# Patient Record
Sex: FEMALE | Race: WHITE | NOT HISPANIC OR LATINO | Employment: FULL TIME | RURAL
[De-identification: names, ages, dates, MRNs, and addresses within clinical notes are randomized per-mention and may not be internally consistent; named-entity substitution may affect disease eponyms.]

---

## 2017-03-27 ENCOUNTER — OFFICE VISIT (OUTPATIENT)
Dept: FAMILY MEDICINE CLINIC | Facility: CLINIC | Age: 37
End: 2017-03-27

## 2017-03-27 VITALS
DIASTOLIC BLOOD PRESSURE: 88 MMHG | HEIGHT: 61 IN | WEIGHT: 133 LBS | BODY MASS INDEX: 25.11 KG/M2 | TEMPERATURE: 99 F | HEART RATE: 93 BPM | SYSTOLIC BLOOD PRESSURE: 133 MMHG

## 2017-03-27 DIAGNOSIS — L04.9 ADENITIS, ACUTE: ICD-10-CM

## 2017-03-27 DIAGNOSIS — L70.0 CYSTIC ACNE: Primary | ICD-10-CM

## 2017-03-27 PROCEDURE — 99213 OFFICE O/P EST LOW 20 MIN: CPT | Performed by: FAMILY MEDICINE

## 2017-03-27 PROCEDURE — 96372 THER/PROPH/DIAG INJ SC/IM: CPT | Performed by: FAMILY MEDICINE

## 2017-03-27 RX ORDER — NORGESTREL-ETHINYL ESTRADIOL 0.3-0.03MG
TABLET ORAL
COMMUNITY
Start: 2017-02-24 | End: 2017-11-30

## 2017-03-27 RX ORDER — METHYLPREDNISOLONE ACETATE 80 MG/ML
80 INJECTION, SUSPENSION INTRA-ARTICULAR; INTRALESIONAL; INTRAMUSCULAR; SOFT TISSUE ONCE
Status: COMPLETED | OUTPATIENT
Start: 2017-03-27 | End: 2017-03-27

## 2017-03-27 RX ORDER — CEPHALEXIN 500 MG/1
500 CAPSULE ORAL 3 TIMES DAILY
Qty: 30 CAPSULE | Refills: 0 | Status: SHIPPED | OUTPATIENT
Start: 2017-03-27 | End: 2017-11-30

## 2017-03-27 RX ADMIN — METHYLPREDNISOLONE ACETATE 80 MG: 80 INJECTION, SUSPENSION INTRA-ARTICULAR; INTRALESIONAL; INTRAMUSCULAR; SOFT TISSUE at 16:30

## 2017-03-27 NOTE — PROGRESS NOTES
"Subjective   Mohit Martines is a 36 y.o. female.     History of Present Illness recurrent facial swelling consistent with prior episodes of cystic acne localized abscess.  Has very painful swelling under chin consistent with recurrent lymph node.  Unfortunately this is recurrent.  Has been taking doxycycline for quite some time as recommended by dermatology.  Has tried other antibiotics as suppressant in the past.  Has used topical therapies has used laser therapy has used light therapy.  Unfortunately things continue to wax and wane/recur.  Has visited with new gynecologist and is on a different formulation now for menstrual cycles to help with some androgenic symptoms and has had menstrual flow for about 2+ weeks.    The following portions of the patient's history were reviewed and updated as appropriate: allergies, current medications, past family history, past social history, past surgical history and problem list.    Review of Systems see the history of present illness    Objective   Physical Exam   Constitutional: She is oriented to person, place, and time. She appears well-developed and well-nourished.   HENT:   Head: Normocephalic.   Right Ear: External ear normal.   Left Ear: External ear normal.   Mouth/Throat: Oropharynx is clear and moist.   Eyes: Conjunctivae and EOM are normal. Pupils are equal, round, and reactive to light.   Neck: Normal range of motion. Neck supple.   Very tender submental reactive lymph node.   Cardiovascular: Normal rate and regular rhythm.    Pulmonary/Chest: Breath sounds normal.   Neurological: She is alert and oriented to person, place, and time.   Skin: Skin is warm and dry.   Has cystic acne outbreak anterior chin and right cheek close to upper lip.   Psychiatric: She has a normal mood and affect.     /88 (BP Location: Right arm, Patient Position: Sitting)  Pulse 93  Temp 99 °F (37.2 °C) (Oral)   Ht 61\" (154.9 cm)  Wt 133 lb (60.3 kg)  BMI 25.13 " kg/m2  Assessment/Plan   Mohit was seen today for facial swelling.    Diagnoses and all orders for this visit:    Cystic acne  -     methylPREDNISolone acetate (DEPO-medrol) injection 80 mg; Inject 1 mL into the shoulder, thigh, or buttocks 1 (One) Time.    Adenitis, acute    Other orders  -     cephalexin (KEFLEX) 500 MG capsule; Take 1 capsule by mouth 3 (Three) Times a Day.      Will try a different antibiotic.  Injection given.  I encourage you to consider a different dermatology consult.  It has been recommended for you to consider Accutane but I appreciate your reluctance to do so.  I am concerned about the long-term antibiotic use and you understand also.  Contact us if symptoms do not improve as they have in the past.

## 2017-04-26 DIAGNOSIS — L70.0 CYSTIC ACNE: Primary | ICD-10-CM

## 2017-11-29 ENCOUNTER — TELEPHONE (OUTPATIENT)
Dept: FAMILY MEDICINE CLINIC | Facility: CLINIC | Age: 37
End: 2017-11-29

## 2017-11-29 NOTE — TELEPHONE ENCOUNTER
PATIENT CALLED AND IS COMPLAINING OF SINUS DRAINAGE DOWN THROAT, NOSE EITHER STOPPED UP OR DRAINAGE, CHILLING, DISCOLORED MUCOUS. SHE IS TAKING TYLENOL AND SUDIFED AND ZYRTEC NIGHTLY. PATIENT USES [facility].

## 2017-11-30 ENCOUNTER — OFFICE VISIT (OUTPATIENT)
Dept: FAMILY MEDICINE CLINIC | Facility: CLINIC | Age: 37
End: 2017-11-30

## 2017-11-30 VITALS
HEIGHT: 61 IN | SYSTOLIC BLOOD PRESSURE: 119 MMHG | HEART RATE: 96 BPM | WEIGHT: 114.6 LBS | DIASTOLIC BLOOD PRESSURE: 79 MMHG | BODY MASS INDEX: 21.64 KG/M2 | OXYGEN SATURATION: 96 % | TEMPERATURE: 98.8 F

## 2017-11-30 DIAGNOSIS — J06.9 ACUTE URI: Primary | ICD-10-CM

## 2017-11-30 DIAGNOSIS — N76.0 ACUTE VAGINITIS: ICD-10-CM

## 2017-11-30 PROCEDURE — 99213 OFFICE O/P EST LOW 20 MIN: CPT | Performed by: NURSE PRACTITIONER

## 2017-11-30 PROCEDURE — 96372 THER/PROPH/DIAG INJ SC/IM: CPT | Performed by: NURSE PRACTITIONER

## 2017-11-30 RX ORDER — NORGESTIMATE AND ETHINYL ESTRADIOL 7DAYSX3 28
1 KIT ORAL DAILY
COMMUNITY
End: 2022-01-21

## 2017-11-30 RX ORDER — AMOXICILLIN AND CLAVULANATE POTASSIUM 875; 125 MG/1; MG/1
TABLET, FILM COATED ORAL
COMMUNITY
Start: 2017-09-26 | End: 2018-12-26

## 2017-11-30 RX ORDER — SPIRONOLACTONE 50 MG/1
TABLET, FILM COATED ORAL
COMMUNITY
Start: 2017-11-12 | End: 2020-02-27

## 2017-11-30 RX ORDER — FLUCONAZOLE 150 MG/1
150 TABLET ORAL ONCE
Qty: 1 TABLET | Refills: 0 | Status: SHIPPED | OUTPATIENT
Start: 2017-11-30 | End: 2017-11-30

## 2017-11-30 RX ORDER — METHYLPREDNISOLONE ACETATE 80 MG/ML
80 INJECTION, SUSPENSION INTRA-ARTICULAR; INTRALESIONAL; INTRAMUSCULAR; SOFT TISSUE ONCE
Status: COMPLETED | OUTPATIENT
Start: 2017-11-30 | End: 2017-11-30

## 2017-11-30 RX ADMIN — METHYLPREDNISOLONE ACETATE 80 MG: 80 INJECTION, SUSPENSION INTRA-ARTICULAR; INTRALESIONAL; INTRAMUSCULAR; SOFT TISSUE at 16:33

## 2017-11-30 NOTE — PROGRESS NOTES
"Subjective   Mohit Martines is a 37 y.o. female.   Chief Complaint   Patient presents with   • Sinusitis     Sinusitis   This is a new problem. The current episode started in the past 7 days. The problem has been waxing and waning since onset. There has been no fever. Associated symptoms include congestion, coughing, ear pain, headaches, sinus pressure and a sore throat. Pertinent negatives include no chills, shortness of breath or sneezing. Treatments tried: 4 doses of augmentin. The treatment provided no relief.   Also states the augmentin has caused a yeast infection.     The following portions of the patient's history were reviewed and updated as appropriate: allergies, current medications, past family history, past medical history, past social history, past surgical history and problem list.  /79 (BP Location: Left arm, Patient Position: Sitting, Cuff Size: Adult)  Pulse 96  Temp 98.8 °F (37.1 °C) (Oral)   Ht 61\" (154.9 cm)  Wt 114 lb 9.6 oz (52 kg)  SpO2 96%  BMI 21.65 kg/m2  Review of Systems   Constitutional: Negative for chills, fatigue and fever.   HENT: Positive for congestion, ear pain, sinus pressure and sore throat. Negative for rhinorrhea and sneezing.    Respiratory: Positive for cough. Negative for shortness of breath and wheezing.    Cardiovascular: Negative for chest pain, palpitations and leg swelling.   Gastrointestinal: Negative for abdominal pain, diarrhea, nausea and vomiting.   Genitourinary: Positive for vaginal discharge. Negative for dysuria.   Musculoskeletal: Negative for back pain and myalgias.   Skin: Negative for rash and wound.   Neurological: Positive for headaches. Negative for dizziness and light-headedness.   Psychiatric/Behavioral: Negative for sleep disturbance. The patient is not nervous/anxious.        Objective   Physical Exam   Constitutional: She is oriented to person, place, and time. She appears well-developed and well-nourished.   HENT:   Head: " Normocephalic and atraumatic.   Bilateral TM full, no erythema  PND   Cardiovascular: Normal rate, regular rhythm and normal heart sounds.    Pulmonary/Chest: Effort normal and breath sounds normal.   Abdominal: Soft. Bowel sounds are normal.   Musculoskeletal: Normal range of motion.   Neurological: She is alert and oriented to person, place, and time.   Skin: Skin is warm and dry.   Psychiatric: She has a normal mood and affect. Her behavior is normal.       Assessment/Plan   Mohit was seen today for sinusitis.    Diagnoses and all orders for this visit:    Acute URI  -     methylPREDNISolone acetate (DEPO-medrol) injection 80 mg; Inject 1 mL into the shoulder, thigh, or buttocks 1 (One) Time.    Acute vaginitis  -     fluconazole (DIFLUCAN) 150 MG tablet; Take 1 tablet by mouth 1 (One) Time for 1 dose.      I have ordered a steroid injection today. She has tolerated well in the past. For the vaginitis, I have prescribed Diflucan. Supportive measures encouraged.  Follow up in 2-3 days if no improvement or if symptoms worsen.

## 2018-12-26 ENCOUNTER — OFFICE VISIT (OUTPATIENT)
Dept: FAMILY MEDICINE CLINIC | Facility: CLINIC | Age: 38
End: 2018-12-26

## 2018-12-26 VITALS
SYSTOLIC BLOOD PRESSURE: 122 MMHG | WEIGHT: 130.2 LBS | BODY MASS INDEX: 24.58 KG/M2 | HEIGHT: 61 IN | TEMPERATURE: 97.2 F | DIASTOLIC BLOOD PRESSURE: 81 MMHG | HEART RATE: 85 BPM

## 2018-12-26 DIAGNOSIS — J01.00 ACUTE MAXILLARY SINUSITIS, RECURRENCE NOT SPECIFIED: Primary | ICD-10-CM

## 2018-12-26 PROCEDURE — 96372 THER/PROPH/DIAG INJ SC/IM: CPT | Performed by: NURSE PRACTITIONER

## 2018-12-26 PROCEDURE — 99213 OFFICE O/P EST LOW 20 MIN: CPT | Performed by: NURSE PRACTITIONER

## 2018-12-26 RX ORDER — CEFTRIAXONE 1 G/1
1 INJECTION, POWDER, FOR SOLUTION INTRAMUSCULAR; INTRAVENOUS ONCE
Status: COMPLETED | OUTPATIENT
Start: 2018-12-26 | End: 2018-12-26

## 2018-12-26 RX ORDER — AMOXICILLIN AND CLAVULANATE POTASSIUM 875; 125 MG/1; MG/1
1 TABLET, FILM COATED ORAL 2 TIMES DAILY
Qty: 10 TABLET | Refills: 0 | Status: SHIPPED | OUTPATIENT
Start: 2018-12-26 | End: 2020-02-27

## 2018-12-26 RX ORDER — CETIRIZINE HYDROCHLORIDE 10 MG/1
10 TABLET ORAL DAILY
Qty: 30 TABLET | Refills: 1 | Status: SHIPPED | OUTPATIENT
Start: 2018-12-26

## 2018-12-26 RX ADMIN — CEFTRIAXONE 1 G: 1 INJECTION, POWDER, FOR SOLUTION INTRAMUSCULAR; INTRAVENOUS at 16:07

## 2018-12-26 NOTE — PROGRESS NOTES
Subjective   Mohit Martines is a 38 y.o. female.     Patient is presenting today for new onset of upper respiratory symptoms.  Symptoms started 3 days ago.  She is reporting severe sinus pain and pressure.  More focused behind eyes and to left sinus temple area. Having some bloody mucus from nose..  Coughing some, but not productive. Takes cetirizine 10 mg daily. Denies any fever or chills.  No vomiting, however does report some nausea that she thinks is from sinus drainage.  Reports she is eating and drinking well.  Some minor throat irritation.  No other complaints today.           The following portions of the patient's history were reviewed and updated as appropriate: allergies, current medications, past family history, past medical history, past social history, past surgical history and problem list.    Review of Systems   HENT: Positive for congestion, ear pain (left more), sinus pressure and sinus pain.    Eyes: Negative.    Respiratory: Positive for cough.    Endocrine: Negative.    Musculoskeletal: Positive for myalgias (legs last pm).   Skin: Negative.    Neurological: Positive for dizziness and headaches.   Hematological: Negative.    Psychiatric/Behavioral: Negative.        Objective   Physical Exam   Constitutional: She appears well-developed. No distress.   HENT:   Head: Normocephalic and atraumatic.   Right Ear: Tympanic membrane normal.   Left Ear: Tympanic membrane normal.   Nose: Mucosal edema and sinus tenderness present. Right sinus exhibits maxillary sinus tenderness and frontal sinus tenderness. Left sinus exhibits maxillary sinus tenderness and frontal sinus tenderness.   Eyes: Conjunctivae and EOM are normal.   Neck: Trachea normal. Neck supple. No JVD present.   Cardiovascular: Normal rate and normal heart sounds.   Pulmonary/Chest: Effort normal and breath sounds normal. No respiratory distress.   Abdominal: Soft. Bowel sounds are normal. She exhibits no distension.   Musculoskeletal:  Normal range of motion. She exhibits no edema.   Lymphadenopathy:     She has cervical adenopathy.        Right cervical: Superficial cervical adenopathy present.        Left cervical: Superficial cervical adenopathy present.   Neurological: She is alert.   Skin: Skin is warm. No rash noted.   Psychiatric: She has a normal mood and affect.   Vitals reviewed.      Assessment/Plan   Mohit was seen today for sinusitis and headache.    Diagnoses and all orders for this visit:    Acute maxillary sinusitis, recurrence not specified  -     cefTRIAXone (ROCEPHIN) injection 1 g; Inject 1 g into the appropriate muscle as directed by prescriber 1 (One) Time.    Other orders  -     amoxicillin-clavulanate (AUGMENTIN) 875-125 MG per tablet; Take 1 tablet by mouth 2 (Two) Times a Day.  -     cetirizine (zyrTEC) 10 MG tablet; Take 1 tablet by mouth Daily.    Will administer Rocephin 1 g IM ×1 dose today.  She will continue on augmentin 875-125 mg by mouth ×5 days.  We will also resume 10 mg Zyrtec by mouth daily.  Instructed to avoid allergens and irritants.  Increase fluids and rest.  Continue other medications with no changes.  Report any GI discomfort and diarrhea nausea or other issues ASAP related to history of C. Difficile and antibiotic therapy.  She will take probiotics during antibiotic therapy.  Over-the-counter ibuprofen or Tylenol for headache.  Patient verbalizes understanding.

## 2020-02-27 ENCOUNTER — OFFICE VISIT (OUTPATIENT)
Dept: FAMILY MEDICINE CLINIC | Facility: CLINIC | Age: 40
End: 2020-02-27

## 2020-02-27 VITALS
HEIGHT: 63 IN | WEIGHT: 149 LBS | TEMPERATURE: 97.6 F | HEART RATE: 79 BPM | SYSTOLIC BLOOD PRESSURE: 120 MMHG | BODY MASS INDEX: 26.4 KG/M2 | DIASTOLIC BLOOD PRESSURE: 80 MMHG

## 2020-02-27 DIAGNOSIS — L70.0 CYSTIC ACNE: Primary | ICD-10-CM

## 2020-02-27 PROCEDURE — 99213 OFFICE O/P EST LOW 20 MIN: CPT | Performed by: NURSE PRACTITIONER

## 2020-02-27 RX ORDER — SPIRONOLACTONE 100 MG/1
50 TABLET, FILM COATED ORAL 3 TIMES DAILY
Qty: 45 TABLET | Refills: 5 | Status: SHIPPED | OUTPATIENT
Start: 2020-02-27 | End: 2020-08-31 | Stop reason: SDUPTHER

## 2020-02-27 NOTE — PATIENT INSTRUCTIONS

## 2020-02-27 NOTE — PROGRESS NOTES
Subjective   Mohit Doherty is a 39 y.o. female.     Chief Complaint   Patient presents with   • Acne       She presents with c/o worsening cystic acne. She states she was referred to dermatology a couple years ago and was started on spironolactone 100mg 1/2 tab three times a day. She states along with birth control her acne really cleared up and she didn't have any problems. She states the dermatologist checked her labs every 6 months or so. She states she had a baby 10 weeks ago and she had to stop the spironolactone before and during her pregnancy. She states she would like to start the spironolactone again. She c/o a couple cystic lesions on her chin and one near her right eye. She states her last period started February 16th. She has resumed the tri-sprintec for birth control.        The following portions of the patient's history were reviewed and updated as appropriate: allergies, current medications, past family history, past medical history, past social history, past surgical history and problem list.    Review of Systems   Constitutional: Negative for fatigue, fever and unexpected weight change.   HENT: Negative for ear pain, rhinorrhea and sore throat.    Eyes: Negative for visual disturbance.   Respiratory: Negative for cough, chest tightness and shortness of breath.    Cardiovascular: Negative for chest pain, palpitations and leg swelling.   Gastrointestinal: Negative for abdominal pain, blood in stool, constipation, diarrhea, nausea and vomiting.   Endocrine: Negative for cold intolerance and heat intolerance.   Genitourinary: Negative for dysuria and hematuria.   Musculoskeletal: Negative for arthralgias and myalgias.   Skin: Positive for rash. Negative for color change.   Allergic/Immunologic: Negative for environmental allergies.   Neurological: Negative for dizziness and headaches.   Hematological: Negative for adenopathy.   Psychiatric/Behavioral: Negative for suicidal ideas. The patient is not  "nervous/anxious.        Objective     /80   Pulse 79   Temp 97.6 °F (36.4 °C)   Ht 160 cm (63\")   Wt 67.6 kg (149 lb)   Breastfeeding No   BMI 26.39 kg/m²     Physical Exam   Constitutional: She is oriented to person, place, and time. Vital signs are normal. She appears well-developed and well-nourished. No distress.   HENT:   Head: Normocephalic and atraumatic.   Cardiovascular: Normal rate, regular rhythm, S1 normal, S2 normal, normal heart sounds and intact distal pulses. Exam reveals no gallop and no friction rub.   No murmur heard.  Pulmonary/Chest: Effort normal and breath sounds normal. No respiratory distress.   Abdominal: Soft. Normal appearance and bowel sounds are normal. She exhibits no distension. There is no tenderness.   Musculoskeletal: She exhibits no edema.   Neurological: She is alert and oriented to person, place, and time.   Skin: Skin is warm and dry. Rash noted. Rash is papular. She is not diaphoretic.   2 cystic acne lesions on the chin. 1 lesion on the nose near the right eye.    Psychiatric: She has a normal mood and affect. Her behavior is normal. Judgment and thought content normal.       Assessment/Plan     Problem List Items Addressed This Visit        Musculoskeletal and Integument    Cystic acne - Primary    Relevant Medications    spironolactone (ALDACTONE) 100 MG tablet    Other Relevant Orders    Comprehensive Metabolic Panel        Plan: Restart spironolactone. Continue tri-sprintec. Labs and follow up in 6 months.    Patient's Body mass index is 26.39 kg/m². BMI is above normal parameters. Recommendations include: educational material.   (Normal BMI:  18.5-24.9, OW 25-29.9, Obesity 30 or greater)          Understands disease processes and need for medications.  Understands reasons for urgent and emergent care.  Patient (& family) verbalized agreement for treatment plan.   Emotional support and active listening provided.  Patient provided time to verbalize " feelings.               This document has been electronically signed by JOHNNY Montano   February 27, 2020 3:15 PM

## 2020-03-25 ENCOUNTER — TELEPHONE (OUTPATIENT)
Dept: FAMILY MEDICINE CLINIC | Facility: CLINIC | Age: 40
End: 2020-03-25

## 2020-03-25 NOTE — TELEPHONE ENCOUNTER
ELITE LORA CALLED TO VERIFY IF DR HAS RECEIVED MEDICAL QUESTIONAIRE. FOR PT.    PLEASE ADVISE.  CALL BACK:[phone]

## 2020-08-06 ENCOUNTER — TELEPHONE (OUTPATIENT)
Dept: FAMILY MEDICINE CLINIC | Facility: CLINIC | Age: 40
End: 2020-08-06

## 2020-08-28 ENCOUNTER — LAB (OUTPATIENT)
Dept: FAMILY MEDICINE CLINIC | Facility: CLINIC | Age: 40
End: 2020-08-28

## 2020-08-28 DIAGNOSIS — L70.0 CYSTIC ACNE: ICD-10-CM

## 2020-08-28 PROCEDURE — 36415 COLL VENOUS BLD VENIPUNCTURE: CPT | Performed by: NURSE PRACTITIONER

## 2020-08-28 PROCEDURE — 80053 COMPREHEN METABOLIC PANEL: CPT | Performed by: NURSE PRACTITIONER

## 2020-08-29 LAB
ALBUMIN SERPL-MCNC: 4.3 G/DL (ref 3.5–5.2)
ALBUMIN/GLOB SERPL: 1.5 G/DL
ALP SERPL-CCNC: 76 U/L (ref 39–117)
ALT SERPL W P-5'-P-CCNC: 6 U/L (ref 1–33)
ANION GAP SERPL CALCULATED.3IONS-SCNC: 8.2 MMOL/L (ref 5–15)
AST SERPL-CCNC: 12 U/L (ref 1–32)
BILIRUB SERPL-MCNC: 0.4 MG/DL (ref 0–1.2)
BUN SERPL-MCNC: 8 MG/DL (ref 6–20)
BUN/CREAT SERPL: 13.6 (ref 7–25)
CALCIUM SPEC-SCNC: 9.4 MG/DL (ref 8.6–10.5)
CHLORIDE SERPL-SCNC: 104 MMOL/L (ref 98–107)
CO2 SERPL-SCNC: 24.8 MMOL/L (ref 22–29)
CREAT SERPL-MCNC: 0.59 MG/DL (ref 0.57–1)
GFR SERPL CREATININE-BSD FRML MDRD: 113 ML/MIN/1.73
GLOBULIN UR ELPH-MCNC: 2.9 GM/DL
GLUCOSE SERPL-MCNC: 91 MG/DL (ref 65–99)
POTASSIUM SERPL-SCNC: 4.2 MMOL/L (ref 3.5–5.2)
PROT SERPL-MCNC: 7.2 G/DL (ref 6–8.5)
SODIUM SERPL-SCNC: 137 MMOL/L (ref 136–145)

## 2020-08-31 ENCOUNTER — OFFICE VISIT (OUTPATIENT)
Dept: FAMILY MEDICINE CLINIC | Facility: CLINIC | Age: 40
End: 2020-08-31

## 2020-08-31 VITALS
TEMPERATURE: 97.3 F | OXYGEN SATURATION: 99 % | WEIGHT: 147 LBS | DIASTOLIC BLOOD PRESSURE: 74 MMHG | HEART RATE: 76 BPM | BODY MASS INDEX: 26.05 KG/M2 | SYSTOLIC BLOOD PRESSURE: 118 MMHG | HEIGHT: 63 IN

## 2020-08-31 DIAGNOSIS — L70.0 CYSTIC ACNE: ICD-10-CM

## 2020-08-31 DIAGNOSIS — M79.10 MYALGIA: ICD-10-CM

## 2020-08-31 DIAGNOSIS — M25.50 PAIN IN JOINT INVOLVING MULTIPLE SITES: Primary | ICD-10-CM

## 2020-08-31 PROCEDURE — 99213 OFFICE O/P EST LOW 20 MIN: CPT | Performed by: NURSE PRACTITIONER

## 2020-08-31 RX ORDER — SPIRONOLACTONE 100 MG/1
50 TABLET, FILM COATED ORAL 3 TIMES DAILY
Qty: 45 TABLET | Refills: 5 | Status: SHIPPED | OUTPATIENT
Start: 2020-08-31 | End: 2021-06-03 | Stop reason: SDUPTHER

## 2020-08-31 NOTE — PROGRESS NOTES
Subjective   Mohit Doherty is a 40 y.o. female.     Chief Complaint   Patient presents with   • Follow-up       She presents for follow up of cystic acne. She reports good compliance and tolerance with spironolactone. She states the acne is much better. She also presents with c/o headache. She states she has had tension headaches since she was young. She states she is seeing the chiropractor and insurance is covering deep tissue massage. She states her arms hurt and she has to sleep with a pillow between her knees to prevent pain at night. She is concerned that she will have fibromyalgia like her grandmother. She states the pain is not debilitating. She c/o pain more so in the muscles but some in the joints. She c/o worsening pain with the deep tissue massage in her hips. She denies redness, swelling, and heat in any joint. She c/o pain in her shoulders, some in her back, her hips, and if she lays on her side, her knees can't hit. She states she works through the tension headaches but the only thing that helps is working through those joints and muscles to get it to ease up.        The following portions of the patient's history were reviewed and updated as appropriate: allergies, current medications, past family history, past medical history, past social history, past surgical history and problem list.    Review of Systems   Constitutional: Negative for fatigue, fever and unexpected weight change.   HENT: Negative for ear pain, rhinorrhea and sore throat.    Eyes: Negative for visual disturbance.   Respiratory: Negative for cough, chest tightness and shortness of breath.    Cardiovascular: Negative for chest pain, palpitations and leg swelling.   Gastrointestinal: Negative for abdominal pain, blood in stool, constipation, diarrhea, nausea and vomiting.   Endocrine: Negative for cold intolerance and heat intolerance.   Genitourinary: Negative for dysuria and hematuria.   Musculoskeletal: Positive for arthralgias,  "myalgias, neck pain and neck stiffness.   Skin: Negative for color change.   Allergic/Immunologic: Negative for environmental allergies.   Neurological: Positive for headaches. Negative for seizures and syncope.   Hematological: Negative for adenopathy.   Psychiatric/Behavioral: Negative for suicidal ideas. The patient is not nervous/anxious.        Objective     /74   Pulse 76   Temp 97.3 °F (36.3 °C)   Ht 160 cm (63\")   Wt 66.7 kg (147 lb)   SpO2 99%   BMI 26.04 kg/m²     Physical Exam   Constitutional: She is oriented to person, place, and time. She appears well-developed and well-nourished. No distress.   HENT:   Head: Normocephalic and atraumatic.   Cardiovascular: Normal rate, regular rhythm, S1 normal, S2 normal, normal heart sounds and intact distal pulses. Exam reveals no gallop and no friction rub.   No murmur heard.  Pulmonary/Chest: Effort normal and breath sounds normal. No respiratory distress.   Abdominal: Soft. Normal appearance and bowel sounds are normal. She exhibits no distension. There is no tenderness.   Musculoskeletal: She exhibits no edema.   Neurological: She is alert and oriented to person, place, and time.   Skin: Skin is warm and dry. She is not diaphoretic.   Psychiatric: She has a normal mood and affect. Her behavior is normal. Judgment and thought content normal.   Vitals reviewed.      Assessment/Plan     Problem List Items Addressed This Visit        Musculoskeletal and Integument    Cystic acne    Relevant Medications    spironolactone (Aldactone) 100 MG tablet      Other Visit Diagnoses     Pain in joint involving multiple sites    -  Primary    Relevant Orders    Ambulatory Referral to Rheumatology    Myalgia        Relevant Orders    Ambulatory Referral to Rheumatology          Plan: Continue spironolactone. CMP normal. Refer to rheumatology to look for cause of joint and muscle pain. Follow up in 6 months and as needed.     Patient's Body mass index is 26.04 kg/m². " BMI is above normal parameters. Recommendations include: educational material.   (Normal BMI:  18.5-24.9, OW 25-29.9, Obesity 30 or greater)          Understands disease processes and need for medications.  Understands reasons for urgent and emergent care.  Patient (& family) verbalized agreement for treatment plan.   Emotional support and active listening provided.  Patient provided time to verbalize feelings.               This document has been electronically signed by JOHNNY Montano   August 31, 2020 16:19

## 2020-08-31 NOTE — PATIENT INSTRUCTIONS

## 2020-11-09 ENCOUNTER — TELEPHONE (OUTPATIENT)
Dept: FAMILY MEDICINE CLINIC | Facility: CLINIC | Age: 40
End: 2020-11-09

## 2020-11-09 ENCOUNTER — OFFICE VISIT (OUTPATIENT)
Dept: FAMILY MEDICINE CLINIC | Facility: CLINIC | Age: 40
End: 2020-11-09

## 2020-11-09 VITALS
RESPIRATION RATE: 18 BRPM | SYSTOLIC BLOOD PRESSURE: 100 MMHG | DIASTOLIC BLOOD PRESSURE: 60 MMHG | OXYGEN SATURATION: 100 % | HEIGHT: 63 IN | TEMPERATURE: 97.8 F | WEIGHT: 148 LBS | HEART RATE: 74 BPM | BODY MASS INDEX: 26.22 KG/M2

## 2020-11-09 DIAGNOSIS — M54.42 ACUTE LEFT-SIDED LOW BACK PAIN WITH LEFT-SIDED SCIATICA: Primary | ICD-10-CM

## 2020-11-09 PROCEDURE — 99214 OFFICE O/P EST MOD 30 MIN: CPT | Performed by: FAMILY MEDICINE

## 2020-11-09 RX ORDER — TIZANIDINE 4 MG/1
TABLET ORAL
COMMUNITY
Start: 2020-11-04 | End: 2021-07-06

## 2020-11-09 RX ORDER — TRAMADOL HYDROCHLORIDE 50 MG/1
50 TABLET ORAL EVERY 6 HOURS PRN
Qty: 45 TABLET | Refills: 0 | Status: SHIPPED | OUTPATIENT
Start: 2020-11-09 | End: 2021-07-06

## 2020-11-09 NOTE — TELEPHONE ENCOUNTER
PATIENT CALLED IN WITH INFORMATION REGARDING REFERRAL THAT WAS TALKED ABOUT AT TODAY'S APPOINTMENT.    PATIENT WOULD LIKE TO GO TO PT [facility] FROM [facility] FOR HER PHYSICAL THERAPY.     PLEASE CALL AND ADVISE AT:  [phone]

## 2020-11-09 NOTE — PROGRESS NOTES
Subjective   Mohit Doherty is a 40 y.o. female.     History of Present Illness about 10 days of nontraumatic mechanical minimally radiating back pain mostly left-sided.  Unsure why.  Did a visit telephonically with provider somewhere.  Has taken some muscle relaxer not much benefit very drowsy.  Still trying to work.  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  Denies fevers.  Denies respiratory GI .  Denies rashes.  Did have a Covid exposure I think about 12 days ago and tested negative in the meantime.  Has visited with chiropractor in the past but not recently.    The following portions of the patient's history were reviewed and updated as appropriate: current medications, past family history, past medical history, past social history, past surgical history and problem list.    Review of Systems  See history of Present Illness     Objective     Physical Exam  Vitals signs reviewed.   Constitutional:       Appearance: Normal appearance. She is well-developed.   HENT:      Head: Normocephalic.   Neck:      Musculoskeletal: Normal range of motion and neck supple.      Thyroid: No thyromegaly.      Trachea: No tracheal deviation.   Cardiovascular:      Rate and Rhythm: Normal rate and regular rhythm.      Heart sounds: Normal heart sounds. No murmur.   Pulmonary:      Effort: Pulmonary effort is normal.      Breath sounds: Normal breath sounds.   Abdominal:      Palpations: Abdomen is soft.      Tenderness: There is no abdominal tenderness.   Musculoskeletal: Normal range of motion.         General: Tenderness present.      Right lower leg: No edema.      Left lower leg: No edema.   Lymphadenopathy:      Cervical: No cervical adenopathy.   Skin:     General: Skin is warm and dry.   Neurological:      General: No focal deficit present.      Mental Status: She is alert and oriented to person, place, and time.      Motor: No weakness.      Gait: Gait normal.      Deep Tendon Reflexes: Reflexes normal.       Comments: Straight leg raise negative bilateral   Psychiatric:         Mood and Affect: Mood normal.         PHQ-9 Total Score:      Patient's There is no height or weight on file to calculate BMI. BMI is above normal parameters. Recommendations include: exercise counseling and nutrition counseling.   (Normal BMI:  18.5-24.9, OW 25-29.9, Obesity 30 or greater)      Assessment/Plan     Diagnoses and all orders for this visit:    1. Acute left-sided low back pain with left-sided sciatica (Primary)  -     traMADol (Ultram) 50 MG tablet; Take 1 tablet by mouth Every 6 (Six) Hours As Needed for Moderate Pain .  Dispense: 45 tablet; Refill: 0    Will make available stronger pain medicine for short period of time.  Warned about risk benefit.  Talked about gentle range of motion activities.  I believe you could benefit from either chiropractic or physical therapy.  You will contemplate and let us know if you will be self referring to chiropractic or if we need to refer for physical therapy.  I do not believe imaging indicated.  Follow-up as needed scheduled.  Hopefully short-lived.  Ede was reviewed.                   This document has been electronically signed by Ziggy Lutz MD   November 9, 2020 11:33 EST    Part of this note may be an electronic transcription/translation of spoken language to printed text using the Dragon Dictation System.

## 2021-01-13 ENCOUNTER — IMMUNIZATION (OUTPATIENT)
Dept: VACCINE CLINIC | Facility: HOSPITAL | Age: 41
End: 2021-01-13

## 2021-01-13 PROCEDURE — 0001A: CPT | Performed by: FAMILY MEDICINE

## 2021-01-13 PROCEDURE — 91300 HC SARSCOV02 VAC 30MCG/0.3ML IM: CPT | Performed by: FAMILY MEDICINE

## 2021-02-03 ENCOUNTER — IMMUNIZATION (OUTPATIENT)
Dept: VACCINE CLINIC | Facility: HOSPITAL | Age: 41
End: 2021-02-03

## 2021-02-03 PROCEDURE — 0002A: CPT | Performed by: INTERNAL MEDICINE

## 2021-02-03 PROCEDURE — 91300 HC SARSCOV02 VAC 30MCG/0.3ML IM: CPT | Performed by: INTERNAL MEDICINE

## 2021-06-03 ENCOUNTER — OFFICE VISIT (OUTPATIENT)
Dept: FAMILY MEDICINE CLINIC | Facility: CLINIC | Age: 41
End: 2021-06-03

## 2021-06-03 VITALS
RESPIRATION RATE: 18 BRPM | TEMPERATURE: 97.5 F | HEART RATE: 105 BPM | OXYGEN SATURATION: 98 % | DIASTOLIC BLOOD PRESSURE: 80 MMHG | HEIGHT: 63 IN | WEIGHT: 148 LBS | SYSTOLIC BLOOD PRESSURE: 100 MMHG | BODY MASS INDEX: 26.22 KG/M2

## 2021-06-03 DIAGNOSIS — F41.1 GENERALIZED ANXIETY DISORDER: Primary | Chronic | ICD-10-CM

## 2021-06-03 DIAGNOSIS — L70.0 CYSTIC ACNE: Chronic | ICD-10-CM

## 2021-06-03 PROCEDURE — 99214 OFFICE O/P EST MOD 30 MIN: CPT | Performed by: NURSE PRACTITIONER

## 2021-06-03 RX ORDER — SPIRONOLACTONE 100 MG/1
50 TABLET, FILM COATED ORAL 3 TIMES DAILY
Qty: 45 TABLET | Refills: 5 | Status: SHIPPED | OUTPATIENT
Start: 2021-06-03 | End: 2021-12-22 | Stop reason: SDUPTHER

## 2021-06-03 RX ORDER — PAROXETINE 10 MG/1
10 TABLET, FILM COATED ORAL EVERY MORNING
Qty: 30 TABLET | Refills: 0 | Status: SHIPPED | OUTPATIENT
Start: 2021-06-03 | End: 2021-06-22 | Stop reason: SDUPTHER

## 2021-06-03 NOTE — PROGRESS NOTES
Subjective   Mohit Doherty is a 40 y.o. female.     Chief Complaint   Patient presents with   • Anxiety       She presents for follow up of cystic acne. She reports good compliance and tolerance. She had a cholecystectomy last week. She has a follow up with surgery on the 14th or 15th. She also c/o anxiety. She has been having episodes where she doesn't have a panic attack but she feels like she is going to. She states it happens when [place]. She states she has passed out when her blood sugar dropped when she was much younger. She states she hasn't passed out in over 20 years. She gets scared that she is going to pass out while driving. She did an online psychiatrist visit and was diagnosed with generalized anxiety. He wanted her to try therapy. She tried it. She worked on the driving and got a tiny bit better. She drove a couple weeks ago and had to call her mom and her sister to have someone talk to her. She has to keep touching something to make sure she is here and awake. Its starting to happen in public in general. It is starting to happen at the hair salon and the nail salon, relaxing places that she shouldn't feel anxious. She was on wellbutrin for migraines in the past. She had two seizures on wellbutrin.       The following portions of the patient's history were reviewed and updated as appropriate: allergies, current medications, past family history, past medical history, past social history, past surgical history and problem list.    Review of Systems   Constitutional: Negative for fatigue, fever and unexpected weight change.   HENT: Negative for ear pain, rhinorrhea and sore throat.    Eyes: Negative for visual disturbance.   Respiratory: Negative for cough, chest tightness and shortness of breath.    Cardiovascular: Negative for chest pain, palpitations and leg swelling.   Gastrointestinal: Negative for abdominal pain, blood in stool, constipation, diarrhea, nausea and vomiting.  "  Endocrine: Negative for cold intolerance and heat intolerance.   Genitourinary: Negative for dysuria.   Musculoskeletal: Negative for arthralgias and myalgias.   Skin: Negative for color change.   Allergic/Immunologic: Negative for environmental allergies.   Hematological: Negative for adenopathy.   Psychiatric/Behavioral: Negative for suicidal ideas. The patient is nervous/anxious.        Objective     /80 (BP Location: Right arm, Patient Position: Sitting, Cuff Size: Adult)   Pulse 105   Temp 97.5 °F (36.4 °C) (Temporal)   Resp 18   Ht 160 cm (62.99\")   Wt 67.1 kg (148 lb)   SpO2 98%   BMI 26.22 kg/m²     Physical Exam  Vitals reviewed.   Constitutional:       General: She is not in acute distress.     Appearance: Normal appearance. She is well-developed. She is not diaphoretic.   HENT:      Head: Normocephalic and atraumatic.   Cardiovascular:      Rate and Rhythm: Normal rate and regular rhythm.      Heart sounds: Normal heart sounds, S1 normal and S2 normal. No murmur heard.   No friction rub. No gallop.    Pulmonary:      Effort: Pulmonary effort is normal. No respiratory distress.      Breath sounds: Normal breath sounds.   Abdominal:      General: Bowel sounds are normal. There is no distension.      Palpations: Abdomen is soft.      Tenderness: There is no abdominal tenderness.   Skin:     General: Skin is warm and dry.   Neurological:      Mental Status: She is alert and oriented to person, place, and time.   Psychiatric:         Behavior: Behavior normal.         Thought Content: Thought content normal.         Judgment: Judgment normal.         Assessment/Plan     Problem List Items Addressed This Visit        Skin    Cystic acne    Relevant Medications    spironolactone (Aldactone) 100 MG tablet      Other Visit Diagnoses     Generalized anxiety disorder    -  Primary    Relevant Medications    PARoxetine (Paxil) 10 MG tablet          Plan: Continue spironolactone. Get labs [facility]. Start paxil for anxiety. Follow up in one month.    @Body mass index is 26.22 kg/m².           Understands disease processes and need for medications.  Understands reasons for urgent and emergent care.  Patient (& family) verbalized agreement for treatment plan.   Emotional support and active listening provided.  Patient provided time to verbalize feelings.               This document has been electronically signed by JOHNNY Montano   Lorena 3, 2021 11:56 EDT

## 2021-06-22 ENCOUNTER — OFFICE VISIT (OUTPATIENT)
Dept: FAMILY MEDICINE CLINIC | Facility: CLINIC | Age: 41
End: 2021-06-22

## 2021-06-22 VITALS
SYSTOLIC BLOOD PRESSURE: 100 MMHG | HEART RATE: 98 BPM | OXYGEN SATURATION: 98 % | WEIGHT: 148 LBS | RESPIRATION RATE: 18 BRPM | DIASTOLIC BLOOD PRESSURE: 80 MMHG | TEMPERATURE: 97.5 F | HEIGHT: 63 IN | BODY MASS INDEX: 26.22 KG/M2

## 2021-06-22 DIAGNOSIS — J03.90 ACUTE TONSILLITIS, UNSPECIFIED ETIOLOGY: Primary | ICD-10-CM

## 2021-06-22 DIAGNOSIS — F41.1 GENERALIZED ANXIETY DISORDER: Chronic | ICD-10-CM

## 2021-06-22 PROCEDURE — 96372 THER/PROPH/DIAG INJ SC/IM: CPT | Performed by: NURSE PRACTITIONER

## 2021-06-22 PROCEDURE — 99213 OFFICE O/P EST LOW 20 MIN: CPT | Performed by: NURSE PRACTITIONER

## 2021-06-22 RX ORDER — CEFTRIAXONE 1 G/1
1 INJECTION, POWDER, FOR SOLUTION INTRAMUSCULAR; INTRAVENOUS ONCE
Status: COMPLETED | OUTPATIENT
Start: 2021-06-22 | End: 2021-06-22

## 2021-06-22 RX ORDER — PAROXETINE 10 MG/1
10 TABLET, FILM COATED ORAL EVERY MORNING
Qty: 30 TABLET | Refills: 5 | Status: SHIPPED | OUTPATIENT
Start: 2021-06-22 | End: 2022-01-21

## 2021-06-22 RX ADMIN — CEFTRIAXONE 1 G: 1 INJECTION, POWDER, FOR SOLUTION INTRAMUSCULAR; INTRAVENOUS at 11:36

## 2021-06-22 NOTE — PROGRESS NOTES
"Subjective   Mohit Doherty is a 40 y.o. female.     Chief Complaint   Patient presents with   • Sore Throat       She presents with c/o sore throat that started last night. She noticed a white spot on her tonsil. It has not improved. She denies fever and chills. Her daughter had a fever and a rash about a week or two ago. She had a rash herself on her face a couple days ago but now it is gone. She also presents for follow up of anxiety. She states she is feeling so calm now and is able to drive. She has only had one episode of anxiety.        The following portions of the patient's history were reviewed and updated as appropriate: allergies, current medications, past family history, past medical history, past social history, past surgical history and problem list.    Review of Systems   Constitutional: Negative for fatigue, fever and unexpected weight change.   HENT: Positive for postnasal drip and sore throat. Negative for ear pain and rhinorrhea.    Eyes: Negative for visual disturbance.   Respiratory: Negative for cough, chest tightness and shortness of breath.    Cardiovascular: Negative for chest pain, palpitations and leg swelling.   Gastrointestinal: Negative for abdominal pain, blood in stool, constipation, diarrhea, nausea and vomiting.   Endocrine: Negative for cold intolerance and heat intolerance.   Genitourinary: Negative for dysuria.   Musculoskeletal: Negative for arthralgias and myalgias.   Skin: Positive for rash. Negative for color change.   Allergic/Immunologic: Positive for environmental allergies.   Hematological: Negative for adenopathy.   Psychiatric/Behavioral: Negative for suicidal ideas. The patient is not nervous/anxious.        Objective     /80 (BP Location: Right arm, Patient Position: Sitting, Cuff Size: Adult)   Pulse 98   Temp 97.5 °F (36.4 °C) (Temporal)   Resp 18   Ht 160 cm (62.99\")   Wt 67.1 kg (148 lb)   SpO2 98%   BMI 26.22 kg/m²     Physical Exam  Vitals reviewed. "   Constitutional:       General: She is not in acute distress.     Appearance: Normal appearance. She is well-developed. She is not diaphoretic.   HENT:      Head: Normocephalic and atraumatic.      Right Ear: Hearing, tympanic membrane, ear canal and external ear normal.      Left Ear: Hearing, tympanic membrane, ear canal and external ear normal.      Nose: Nose normal.      Right Sinus: No maxillary sinus tenderness or frontal sinus tenderness.      Left Sinus: No maxillary sinus tenderness or frontal sinus tenderness.      Mouth/Throat:      Mouth: Mucous membranes are moist.      Pharynx: Uvula midline. Pharyngeal swelling present.      Tonsils: Tonsillar exudate present.   Eyes:      General: Lids are normal.      Conjunctiva/sclera: Conjunctivae normal.      Pupils: Pupils are equal, round, and reactive to light.   Neck:      Trachea: Trachea normal. No tracheal tenderness or tracheal deviation.   Cardiovascular:      Rate and Rhythm: Normal rate and regular rhythm.      Heart sounds: Normal heart sounds, S1 normal and S2 normal. No murmur heard.   No friction rub. No gallop.    Pulmonary:      Effort: Pulmonary effort is normal. No respiratory distress.      Breath sounds: Normal breath sounds.   Abdominal:      General: Bowel sounds are normal. There is no distension.      Palpations: Abdomen is soft.      Tenderness: There is no abdominal tenderness.   Lymphadenopathy:      Cervical: No cervical adenopathy.   Skin:     General: Skin is warm and dry.   Neurological:      Mental Status: She is alert and oriented to person, place, and time.   Psychiatric:         Behavior: Behavior normal.         Thought Content: Thought content normal.         Judgment: Judgment normal.         Assessment/Plan     Problem List Items Addressed This Visit     None      Visit Diagnoses     Acute tonsillitis, unspecified etiology    -  Primary    Relevant Medications    cefTRIAXone (ROCEPHIN) injection 1 g    Generalized  anxiety disorder  (Chronic)       Relevant Medications    PARoxetine (Paxil) 10 MG tablet          Plan: Rocephin ordered for acute tonsillitis Rest. Continue paroxetine for anxiety. Follow up in 6 months and as needed.     @Body mass index is 26.22 kg/m².           Understands disease processes and need for medications.  Understands reasons for urgent and emergent care.  Patient (& family) verbalized agreement for treatment plan.   Emotional support and active listening provided.  Patient provided time to verbalize feelings.               This document has been electronically signed by JOHNNY Montano   June 22, 2021 11:30 EDT

## 2021-07-06 ENCOUNTER — OFFICE VISIT (OUTPATIENT)
Dept: FAMILY MEDICINE CLINIC | Facility: CLINIC | Age: 41
End: 2021-07-06

## 2021-07-06 VITALS
SYSTOLIC BLOOD PRESSURE: 100 MMHG | TEMPERATURE: 97.5 F | RESPIRATION RATE: 16 BRPM | HEART RATE: 95 BPM | WEIGHT: 148 LBS | DIASTOLIC BLOOD PRESSURE: 80 MMHG | OXYGEN SATURATION: 99 % | HEIGHT: 63 IN | BODY MASS INDEX: 26.22 KG/M2

## 2021-07-06 DIAGNOSIS — B02.9 HERPES ZOSTER WITHOUT COMPLICATION: Primary | ICD-10-CM

## 2021-07-06 PROCEDURE — 99213 OFFICE O/P EST LOW 20 MIN: CPT | Performed by: FAMILY MEDICINE

## 2021-07-06 NOTE — PROGRESS NOTES
Subjective   Moiht Doherty is a 40 y.o. female.     History of Present Illness comes in experiencing what is felt to be recurrent zoster top of right shoulder.  Has existed for about 2 weeks.  Started Valtrex couple days ago.  Not really spreading.  Not really going away.  States has had recurrent zoster 5-7 times.  Sadly multiple family members have experienced frequent recurrences also.  This flare seems to have been precipitated by the recent tonsillitis event.    The following portions of the patient's history were reviewed and updated as appropriate: allergies, current medications, past family history, past medical history, past surgical history and problem list.    Review of Systems  See history of Present Illness     Objective     Physical Exam  Vitals reviewed.   Constitutional:       Appearance: Normal appearance.   Skin:     General: Skin is warm and dry.      Comments: Red minimally vesiculated eruption right anterior top of shoulder.   Neurological:      Mental Status: She is alert and oriented to person, place, and time.   Psychiatric:         Mood and Affect: Mood normal.         PHQ-9 Total Score:      Body mass index is 26.22 kg/m².       Assessment/Plan     Diagnoses and all orders for this visit:    1. Herpes zoster without complication (Primary)    With the time frame you explain I would stop the Valtrex.  Save it for the next potential flare.  I am unsure what to recommend regarding the frequent recurrences.  I seem to be always related to extreme stress and/or infectious events.  You have not experienced disseminated issues.  Keep this area clean dry.  Avoid topicals.  Follow-up as needed.                     This document has been electronically signed by Ziggy Lutz MD   July 6, 2021 17:28 EDT    Part of this note may be an electronic transcription/translation of spoken language to printed text using the Dragon Dictation System.

## 2021-09-09 ENCOUNTER — OFFICE VISIT (OUTPATIENT)
Dept: FAMILY MEDICINE CLINIC | Facility: CLINIC | Age: 41
End: 2021-09-09

## 2021-09-09 VITALS
SYSTOLIC BLOOD PRESSURE: 121 MMHG | WEIGHT: 154.8 LBS | DIASTOLIC BLOOD PRESSURE: 75 MMHG | OXYGEN SATURATION: 99 % | BODY MASS INDEX: 27.43 KG/M2 | HEIGHT: 63 IN | TEMPERATURE: 97.8 F | HEART RATE: 81 BPM

## 2021-09-09 DIAGNOSIS — R53.83 FATIGUE, UNSPECIFIED TYPE: Primary | ICD-10-CM

## 2021-09-09 DIAGNOSIS — L30.9 DERMATITIS: ICD-10-CM

## 2021-09-09 DIAGNOSIS — F41.9 ANXIETY: ICD-10-CM

## 2021-09-09 PROCEDURE — 84443 ASSAY THYROID STIM HORMONE: CPT | Performed by: INTERNAL MEDICINE

## 2021-09-09 PROCEDURE — 82607 VITAMIN B-12: CPT | Performed by: INTERNAL MEDICINE

## 2021-09-09 PROCEDURE — 99214 OFFICE O/P EST MOD 30 MIN: CPT | Performed by: INTERNAL MEDICINE

## 2021-09-09 PROCEDURE — 83036 HEMOGLOBIN GLYCOSYLATED A1C: CPT | Performed by: INTERNAL MEDICINE

## 2021-09-09 PROCEDURE — 84439 ASSAY OF FREE THYROXINE: CPT | Performed by: INTERNAL MEDICINE

## 2021-09-09 PROCEDURE — 80053 COMPREHEN METABOLIC PANEL: CPT | Performed by: INTERNAL MEDICINE

## 2021-09-09 PROCEDURE — 85027 COMPLETE CBC AUTOMATED: CPT | Performed by: INTERNAL MEDICINE

## 2021-09-09 PROCEDURE — 80061 LIPID PANEL: CPT | Performed by: INTERNAL MEDICINE

## 2021-09-09 NOTE — PROGRESS NOTES
Patient Name: Mohit Doherty Today's Date: 2021   Patient MRN / CSN: [id number] / [id number] Date of Encounter: 2021   Patient Age / : 41 y.o. / 1980 Encounter Provider: Alla Wilson DO   Referring Physician: No ref. provider found          Mohit is a 41 y.o. who is being seen today for Fatigue (Patient suspects fatigue is caused from shingles or possibly Paxil. ) and Herpes Zoster (Patient states that shingles will heal and then new places come up right away. )      Patient presents today for follow-up visit with complaint of fatigue.  She has noted this fatigue has been worse since the end of July.  She reports having Covid at that time but reports that she had a mild case of it with cough, nasal congestion, and loss of taste and smell.  She had had the Covid vaccines earlier in the year before having the illness.  She has noted fatigue worse since then.  She also started Paxil this summer and wonders if the Paxil is making her drowsy during the day.  She feels that paxil is otherwise helping to control her anxiety well.  Patient has not had routine labs done in a while and would like her thyroid checked.  She does report a family history of thyroid disease and wonders if this is playing a role in her fatigue.    Patient reports being treated multiple times recently for recurrent shingles.  She reports having an episode of shingles on her back and lower leg years ago that responded very well to Valtrex.  She reports more recently having red, raised rashes that appear in different areas which have been treated as shingles.  She currently has a few red places on her right shoulder.  She reports this was treated with Valtrex recently but did not respond to treatment.  She has been established with dermatology in [place] at [facility] for a long time but has not seen them recently.  She reports they started her on spironolactone for cystic acne and she has done well with this  "regimen.      Allergies include:Sulfamethoxazole-trimethoprim  Current Outpatient Medications   Medication Sig Dispense Refill   • cetirizine (zyrTEC) 10 MG tablet Take 1 tablet by mouth Daily. 30 tablet 1   • norgestimate-ethinyl estradiol (TRI-SPRINTEC) 0.18/0.215/0.25 MG-35 MCG per tablet Take 1 tablet by mouth Daily.     • PARoxetine (Paxil) 10 MG tablet Take 1 tablet by mouth Every Morning. 30 tablet 5   • spironolactone (Aldactone) 100 MG tablet Take 0.5 tablets by mouth 3 (Three) Times a Day. 45 tablet 5     No current facility-administered medications for this visit.     Past Medical History:   Diagnosis Date   • C. difficile colitis    • Colitis due to Clostridium difficile 10/21/2016   • Herpes zoster 10/21/2016     Family History   Problem Relation Age of Onset   • No Known Problems Mother    • Hypertension Father    • Thyroid disease Sister    • Hypertension Maternal Grandmother    • Diabetes Paternal Grandmother      Past Surgical History:   Procedure Laterality Date   • APPENDECTOMY     • CHOLECYSTECTOMY     • WISDOM TOOTH EXTRACTION       Social History     Substance and Sexual Activity   Alcohol Use No     Social History     Tobacco Use   Smoking Status Never Smoker   Smokeless Tobacco Never Used     Social History     Substance and Sexual Activity   Drug Use No     Review of Systems   Constitutional: Positive for fatigue.   Endocrine: Positive for polyuria. Negative for polydipsia and polyphagia.   Skin: Positive for rash.        Recurrent on right shoulder        Depression Assessment Review:  PHQ-9 Total Score:    Vital Signs & Measurements Taken This Encounter  /75 (BP Location: Left arm, Patient Position: Sitting, Cuff Size: Adult)   Pulse 81   Temp 97.8 °F (36.6 °C) (Temporal)   Ht 160 cm (62.99\")   Wt 70.2 kg (154 lb 12.8 oz)   SpO2 99%   BMI 27.43 kg/m²    SpO2 Percentage    09/09/21 1557   SpO2: 99%        Physical Exam  Vitals reviewed.   Constitutional:       General: She is " not in acute distress.  HENT:      Head: Normocephalic and atraumatic.   Neck:      Comments: No thyromegaly or thyroid tenderness  Cardiovascular:      Rate and Rhythm: Normal rate and regular rhythm.   Pulmonary:      Effort: Pulmonary effort is normal. No respiratory distress.      Breath sounds: Normal breath sounds. No wheezing or rhonchi.   Musculoskeletal:         General: No swelling.      Cervical back: Neck supple.   Skin:     General: Skin is warm and dry.      Coloration: Skin is not jaundiced.      Comments: 2 areas of erythema on the right posterior shoulder without vesicular lesions.  These flat, erythematous areas have irregular borders.   Neurological:      Mental Status: She is alert.   Psychiatric:         Mood and Affect: Mood normal.         Behavior: Behavior normal.              Assessment & Plan  Patient Active Problem List   Diagnosis   • Cystic acne   • Anxiety       ICD-10-CM ICD-9-CM   1. Fatigue, unspecified type  R53.83 780.79   2. Anxiety  F41.9 300.00   3. Dermatitis  L30.9 692.9     Orders Placed This Encounter   Procedures   • CBC (No Diff)     Order Specific Question:   Release to patient     Answer:   Immediate   • Comprehensive Metabolic Panel     Order Specific Question:   Release to patient     Answer:   Immediate   • TSH     Order Specific Question:   Release to patient     Answer:   Immediate   • Lipid Panel     Order Specific Question:   Release to patient     Answer:   Immediate   • Hemoglobin A1c     Order Specific Question:   Release to patient     Answer:   Immediate   • Vitamin B12     Order Specific Question:   Release to patient     Answer:   Immediate   • T4, free     Order Specific Question:   Release to patient     Answer:   Immediate   • Ambulatory Referral to Dermatology     Referral Priority:   Routine     Referral Type:   Consultation     Referral Reason:   Specialty Services Required     Requested Specialty:   Dermatology     Number of Visits Requested:   1        Meds Ordered During Visit:  No orders of the defined types were placed in this encounter.    Will update labs today as ordered above.  I suspect that the fatigue may be residual from her recent COVID-19 infection and I have explained this to patient.  We will update her on her lab results when available.  Continue current medicines.  I do not feel the current rash is shingles.  I have recommended a dermatology reevaluation given that she has had recurrent rashes recently.  Patient is agreeable to this.  We will schedule with her previous dermatology group.  Patient has had her COVID vaccines.  I encouraged her to continue COVID-19 precautions, including wearing a mask in public and social distancing.    Return if symptoms worsen or fail to improve, for Keep appt with Renu in Dec as planned.          Referring Provider (if known): No ref. provider found      This document has been electronically signed by Alla Wilson DO  September 9, 2021 16:41 EDT    Alla Wilson DO, FACOI  [address] [place] [address]  [phone] (office)    Part of this note may be an electronic transcription/translation of spoken language to printed text using the Dragon Dictation System.

## 2021-09-10 LAB
ALBUMIN SERPL-MCNC: 4.5 G/DL (ref 3.5–5.2)
ALBUMIN/GLOB SERPL: 1.6 G/DL
ALP SERPL-CCNC: 89 U/L (ref 39–117)
ALT SERPL W P-5'-P-CCNC: 7 U/L (ref 1–33)
ANION GAP SERPL CALCULATED.3IONS-SCNC: 12.5 MMOL/L (ref 5–15)
AST SERPL-CCNC: <5 U/L (ref 1–32)
BILIRUB SERPL-MCNC: 0.3 MG/DL (ref 0–1.2)
BUN SERPL-MCNC: 10 MG/DL (ref 6–20)
BUN/CREAT SERPL: 14.9 (ref 7–25)
CALCIUM SPEC-SCNC: 9.8 MG/DL (ref 8.6–10.5)
CHLORIDE SERPL-SCNC: 100 MMOL/L (ref 98–107)
CHOLEST SERPL-MCNC: 187 MG/DL (ref 0–200)
CO2 SERPL-SCNC: 25.5 MMOL/L (ref 22–29)
CREAT SERPL-MCNC: 0.67 MG/DL (ref 0.57–1)
DEPRECATED RDW RBC AUTO: 40.1 FL (ref 37–54)
ERYTHROCYTE [DISTWIDTH] IN BLOOD BY AUTOMATED COUNT: 11.9 % (ref 12.3–15.4)
GFR SERPL CREATININE-BSD FRML MDRD: 97 ML/MIN/1.73
GLOBULIN UR ELPH-MCNC: 2.9 GM/DL
GLUCOSE SERPL-MCNC: 74 MG/DL (ref 65–99)
HBA1C MFR BLD: <4.3 % (ref 4.8–5.6)
HCT VFR BLD AUTO: 39.5 % (ref 34–46.6)
HDLC SERPL-MCNC: 61 MG/DL (ref 40–60)
HGB BLD-MCNC: 13.4 G/DL (ref 12–15.9)
LDLC SERPL CALC-MCNC: 99 MG/DL (ref 0–100)
LDLC/HDLC SERPL: 1.56 {RATIO}
MCH RBC QN AUTO: 31.1 PG (ref 26.6–33)
MCHC RBC AUTO-ENTMCNC: 33.9 G/DL (ref 31.5–35.7)
MCV RBC AUTO: 91.6 FL (ref 79–97)
PLATELET # BLD AUTO: 431 10*3/MM3 (ref 140–450)
PMV BLD AUTO: 9.4 FL (ref 6–12)
POTASSIUM SERPL-SCNC: 4.7 MMOL/L (ref 3.5–5.2)
PROT SERPL-MCNC: 7.4 G/DL (ref 6–8.5)
RBC # BLD AUTO: 4.31 10*6/MM3 (ref 3.77–5.28)
SODIUM SERPL-SCNC: 138 MMOL/L (ref 136–145)
T4 FREE SERPL-MCNC: 1.15 NG/DL (ref 0.93–1.7)
TRIGL SERPL-MCNC: 155 MG/DL (ref 0–150)
TSH SERPL DL<=0.05 MIU/L-ACNC: 1.24 UIU/ML (ref 0.27–4.2)
VIT B12 BLD-MCNC: 416 PG/ML (ref 211–946)
VLDLC SERPL-MCNC: 27 MG/DL (ref 5–40)
WBC # BLD AUTO: 6.97 10*3/MM3 (ref 3.4–10.8)

## 2021-11-02 ENCOUNTER — TELEMEDICINE (OUTPATIENT)
Dept: FAMILY MEDICINE CLINIC | Facility: CLINIC | Age: 41
End: 2021-11-02

## 2021-11-02 DIAGNOSIS — J02.9 ACUTE PHARYNGITIS, UNSPECIFIED ETIOLOGY: ICD-10-CM

## 2021-11-02 DIAGNOSIS — J06.9 ACUTE URI: Primary | ICD-10-CM

## 2021-11-02 PROCEDURE — 99213 OFFICE O/P EST LOW 20 MIN: CPT | Performed by: NURSE PRACTITIONER

## 2021-11-02 RX ORDER — METHYLPREDNISOLONE 4 MG/1
TABLET ORAL
Qty: 21 TABLET | Refills: 0 | Status: SHIPPED | OUTPATIENT
Start: 2021-11-02 | End: 2021-12-22

## 2021-11-02 RX ORDER — DEXTROMETHORPHAN HYDROBROMIDE AND PROMETHAZINE HYDROCHLORIDE 15; 6.25 MG/5ML; MG/5ML
5 SYRUP ORAL 4 TIMES DAILY PRN
Qty: 240 ML | Refills: 0 | Status: SHIPPED | OUTPATIENT
Start: 2021-11-02 | End: 2021-12-22

## 2021-11-02 RX ORDER — AMOXICILLIN AND CLAVULANATE POTASSIUM 875; 125 MG/1; MG/1
1 TABLET, FILM COATED ORAL 2 TIMES DAILY
Qty: 20 TABLET | Refills: 0 | Status: SHIPPED | OUTPATIENT
Start: 2021-11-02 | End: 2021-11-12

## 2021-11-02 NOTE — PROGRESS NOTES
Subjective   Mohit Doherty is a 41 y.o. female.     Chief Complaint   Patient presents with   • URI       She presents via video visit with c/o sore throat for the past couple days. She c/o a lot of sinus drainage. She states one area of her throat looks like it has infection. She feels like she has fluid in her ears. She took some mucinex that seemed to help a little. She c/o waking up coughing at night with drainage. She has had the covid vaccine and has had her booster. She denies fever and loss of taste and smell.        The following portions of the patient's history were reviewed and updated as appropriate: allergies, current medications, past family history, past medical history, past social history, past surgical history and problem list.    Review of Systems   Constitutional: Negative for fatigue, fever and unexpected weight change.   HENT: Positive for postnasal drip, sinus pressure and sore throat. Negative for ear pain and rhinorrhea.    Eyes: Negative for visual disturbance.   Respiratory: Positive for cough. Negative for chest tightness, shortness of breath and wheezing.    Cardiovascular: Negative for chest pain, palpitations and leg swelling.   Gastrointestinal: Negative for abdominal pain, blood in stool, constipation, diarrhea, nausea and vomiting.   Endocrine: Negative for cold intolerance and heat intolerance.   Genitourinary: Negative for dysuria and hematuria.   Musculoskeletal: Negative for arthralgias and myalgias.   Skin: Negative for color change.   Allergic/Immunologic: Negative for environmental allergies.   Neurological: Positive for dizziness and headaches.   Hematological: Negative for adenopathy.   Psychiatric/Behavioral: Negative for suicidal ideas. The patient is not nervous/anxious.        Objective     There were no vitals taken for this visit.    Physical Exam  Constitutional:       Appearance: Normal appearance.   Pulmonary:      Effort: Pulmonary effort is normal.    Neurological:      Mental Status: She is alert and oriented to person, place, and time.   Psychiatric:         Mood and Affect: Mood normal.         Behavior: Behavior normal.         Assessment/Plan     Problem List Items Addressed This Visit     None      Visit Diagnoses     Acute URI    -  Primary    Relevant Medications    amoxicillin-clavulanate (Augmentin) 875-125 MG per tablet    methylPREDNISolone (MEDROL) 4 MG dose pack    promethazine-dextromethorphan (PROMETHAZINE-DM) 6.25-15 MG/5ML syrup    Acute pharyngitis, unspecified etiology        Relevant Medications    amoxicillin-clavulanate (Augmentin) 875-125 MG per tablet          Plan: She had covid in July. She declines covid screening. Augmentin and medrol ordered for pharyngitis and uri. Follow up as needed and keep scheduled follow up.                Understands disease processes and need for medications.  Understands reasons for urgent and emergent care.  Patient (& family) verbalized agreement for treatment plan.   Emotional support and active listening provided.  Patient provided time to verbalize feelings.    The use of a video visit has been reviewed with the patient and verbal informed consent has been obtained.             This document has been electronically signed by JOHNNY Montano   November 2, 2021 14:46 EDT

## 2021-12-22 ENCOUNTER — OFFICE VISIT (OUTPATIENT)
Dept: FAMILY MEDICINE CLINIC | Facility: CLINIC | Age: 41
End: 2021-12-22

## 2021-12-22 VITALS
SYSTOLIC BLOOD PRESSURE: 125 MMHG | HEIGHT: 63 IN | BODY MASS INDEX: 29.06 KG/M2 | WEIGHT: 164 LBS | DIASTOLIC BLOOD PRESSURE: 75 MMHG | HEART RATE: 84 BPM | RESPIRATION RATE: 18 BRPM | OXYGEN SATURATION: 99 % | TEMPERATURE: 97.5 F

## 2021-12-22 DIAGNOSIS — F41.9 ANXIETY: Primary | Chronic | ICD-10-CM

## 2021-12-22 DIAGNOSIS — L70.0 CYSTIC ACNE: Chronic | ICD-10-CM

## 2021-12-22 DIAGNOSIS — E66.3 OVERWEIGHT: ICD-10-CM

## 2021-12-22 PROCEDURE — 99214 OFFICE O/P EST MOD 30 MIN: CPT | Performed by: NURSE PRACTITIONER

## 2021-12-22 RX ORDER — SPIRONOLACTONE 100 MG/1
50 TABLET, FILM COATED ORAL 3 TIMES DAILY
Qty: 45 TABLET | Refills: 5 | Status: SHIPPED | OUTPATIENT
Start: 2021-12-22 | End: 2023-01-17 | Stop reason: SDUPTHER

## 2021-12-22 NOTE — PROGRESS NOTES
"Subjective   Mohit Doherty is a 41 y.o. female.     Chief Complaint   Patient presents with   • Anxiety     follow up       She presents for follow up of generalized anxiety disorder. She states she is more active and she is eating less but she continues to gain weight. She feels like the paxil is making her gain weight. She feels depressed because of the weight gain. She has an online therapy appointment with a psychologist from . She c/o fatigue. She states the spironolactone is working well.       The following portions of the patient's history were reviewed and updated as appropriate: allergies, current medications, past family history, past medical history, past social history, past surgical history and problem list.    Review of Systems   Constitutional: Positive for unexpected weight change (weight gain). Negative for fatigue and fever.   HENT: Negative for ear pain, rhinorrhea and sore throat.    Eyes: Negative for visual disturbance.   Respiratory: Negative for cough, chest tightness and shortness of breath.    Cardiovascular: Negative for chest pain, palpitations and leg swelling.   Gastrointestinal: Negative for abdominal pain, blood in stool, constipation, diarrhea, nausea and vomiting.   Endocrine: Negative for cold intolerance and heat intolerance.   Genitourinary: Negative for dysuria.   Musculoskeletal: Negative for arthralgias and myalgias.   Skin: Negative for color change.   Allergic/Immunologic: Negative for environmental allergies.   Hematological: Negative for adenopathy.   Psychiatric/Behavioral: Negative for suicidal ideas. The patient is not nervous/anxious.        Objective     /75 (BP Location: Right arm, Patient Position: Sitting, Cuff Size: Adult)   Pulse 84   Temp 97.5 °F (36.4 °C) (Temporal)   Resp 18   Ht 160 cm (62.99\")   Wt 74.4 kg (164 lb)   SpO2 99%   BMI 29.06 kg/m²     Physical Exam  Vitals reviewed.   Constitutional:       General: She is not in acute distress.   "   Appearance: Normal appearance. She is well-developed. She is not diaphoretic.   HENT:      Head: Normocephalic and atraumatic.   Cardiovascular:      Rate and Rhythm: Normal rate and regular rhythm.      Heart sounds: Normal heart sounds, S1 normal and S2 normal. No murmur heard.  No friction rub. No gallop.    Pulmonary:      Effort: Pulmonary effort is normal. No respiratory distress.      Breath sounds: Normal breath sounds.   Abdominal:      General: Bowel sounds are normal. There is no distension.      Palpations: Abdomen is soft.      Tenderness: There is no abdominal tenderness.   Skin:     General: Skin is warm and dry.   Neurological:      Mental Status: She is alert and oriented to person, place, and time.   Psychiatric:         Behavior: Behavior normal.         Thought Content: Thought content normal.         Judgment: Judgment normal.         Assessment/Plan     Problem List Items Addressed This Visit        Mental Health    Anxiety - Primary       Skin    Cystic acne      Other Visit Diagnoses     Overweight              Plan: Gradually wean off the paxil. Start with 1/2 tab daily for two weeks, then 1/4 tab daily for 2 weeks, then 1/4 tab every other day for two weeks then stop. She requests something to help with weight loss. She cannot take bupropion because she had seizures on it in the past. Adipex is dangerous and can cause heart arrhythmias. David causes diarrhea. She already has trouble with diarrhea since she had her gallbladder removed. Try saxenda for weight loss. Continue spironolactone. Follow up in 1 month and as needed.     @Body mass index is 29.06 kg/m².              Understands disease processes and need for medications.  Understands reasons for urgent and emergent care.  Patient (& family) verbalized agreement for treatment plan.   Emotional support and active listening provided.  Patient provided time to verbalize feelings.               This document has been electronically signed  by Renu Padron, APRN   December 22, 2021 16:33 EST

## 2021-12-27 ENCOUNTER — TELEPHONE (OUTPATIENT)
Dept: FAMILY MEDICINE CLINIC | Facility: CLINIC | Age: 41
End: 2021-12-27

## 2021-12-27 NOTE — TELEPHONE ENCOUNTER
Saxenda 18MG/3ML pen-injectors    Prior Authorization submitted via Cover My Med[employer].     Awaiting response.

## 2021-12-28 NOTE — TELEPHONE ENCOUNTER
Patient informed of insurance denial and states that she has already printed a coupon from the  that she will use to lower her copay.

## 2021-12-28 NOTE — TELEPHONE ENCOUNTER
Prior Authorizations DENIED.    Patients insurance plan covers this drug when patient meets one of these conditions:  - Have at least one weight-related comorbid condition (e.g., hypertension, type 2  diabetes mellitus or dyslipidemia)    I will call patient to inform her of this. Is there an alternative you want to send? I can also do an appeal with her insurance.

## 2021-12-29 ENCOUNTER — TELEPHONE (OUTPATIENT)
Dept: FAMILY MEDICINE CLINIC | Facility: CLINIC | Age: 41
End: 2021-12-29

## 2021-12-29 NOTE — TELEPHONE ENCOUNTER
Saxenda 18MG/3ML pen-injectors      Prior Authorization denied. Coupon does not lower medication cost to an affordable price. Appeal submitted.

## 2022-01-05 ENCOUNTER — TELEPHONE (OUTPATIENT)
Dept: FAMILY MEDICINE CLINIC | Facility: CLINIC | Age: 42
End: 2022-01-05

## 2022-01-05 NOTE — TELEPHONE ENCOUNTER
Caller: Mohit Doherty    Relationship: Self    Best call back number: [phone]    What is the best time to reach you: NA    Who are you requesting to speak with (clinical staff, provider,  specific staff member): NA    Do you know the name of the person who called: NA    What was the call regarding: PATIENT CALLED TO FOLLOW UP REGARDING IF THE APPEAL WENT THROUGH FOR HER MEDICATION REQUEST.      Maxine Dyer MA AL    12/29/21 11:16 AM  Note  Saxenda 18MG/3ML pen-injectors        Prior Authorization denied. Coupon does not lower medication cost to an affordable price. Appeal submitted.           Do you require a callback: YES

## 2022-01-05 NOTE — TELEPHONE ENCOUNTER
I called [facility] to inquire about the status of medication appeal. Representative informed me that it is still in process. I informed the patient of this who verbalized understanding.

## 2022-01-06 DIAGNOSIS — E66.3 OVERWEIGHT: Primary | ICD-10-CM

## 2022-01-06 RX ORDER — SEMAGLUTIDE 1.34 MG/ML
0.25 INJECTION, SOLUTION SUBCUTANEOUS WEEKLY
Qty: 1 PEN | Refills: 2 | Status: SHIPPED | OUTPATIENT
Start: 2022-01-06 | End: 2022-01-21

## 2022-01-06 RX ORDER — TOPIRAMATE 25 MG/1
25 TABLET ORAL 2 TIMES DAILY
Qty: 30 TABLET | Refills: 0 | Status: SHIPPED | OUTPATIENT
Start: 2022-01-06 | End: 2022-01-21 | Stop reason: SDUPTHER

## 2022-01-21 ENCOUNTER — OFFICE VISIT (OUTPATIENT)
Dept: FAMILY MEDICINE CLINIC | Facility: CLINIC | Age: 42
End: 2022-01-21

## 2022-01-21 VITALS
WEIGHT: 158 LBS | DIASTOLIC BLOOD PRESSURE: 74 MMHG | BODY MASS INDEX: 28 KG/M2 | HEIGHT: 63 IN | OXYGEN SATURATION: 99 % | TEMPERATURE: 96.8 F | SYSTOLIC BLOOD PRESSURE: 119 MMHG | HEART RATE: 90 BPM | RESPIRATION RATE: 18 BRPM

## 2022-01-21 DIAGNOSIS — Z78.9 USES BIRTH CONTROL: Primary | ICD-10-CM

## 2022-01-21 DIAGNOSIS — E66.3 OVERWEIGHT: ICD-10-CM

## 2022-01-21 DIAGNOSIS — R07.81 RIB PAIN ON LEFT SIDE: ICD-10-CM

## 2022-01-21 PROCEDURE — 99214 OFFICE O/P EST MOD 30 MIN: CPT | Performed by: NURSE PRACTITIONER

## 2022-01-21 RX ORDER — NORGESTIMATE AND ETHINYL ESTRADIOL 0.25-0.035
1 KIT ORAL DAILY
Qty: 28 TABLET | Refills: 12
Start: 2022-01-21 | End: 2022-08-18 | Stop reason: SDUPTHER

## 2022-01-21 RX ORDER — TOPIRAMATE 25 MG/1
25 TABLET ORAL 2 TIMES DAILY
Qty: 30 TABLET | Refills: 1 | Status: SHIPPED | OUTPATIENT
Start: 2022-01-21 | End: 2022-01-25 | Stop reason: SDUPTHER

## 2022-01-21 RX ORDER — METHYLPREDNISOLONE 4 MG/1
TABLET ORAL
Qty: 21 TABLET | Refills: 0 | Status: SHIPPED | OUTPATIENT
Start: 2022-01-21 | End: 2022-02-22

## 2022-01-21 RX ORDER — IBUPROFEN 600 MG/1
600 TABLET ORAL EVERY 6 HOURS PRN
Qty: 90 TABLET | Refills: 0 | Status: SHIPPED | OUTPATIENT
Start: 2022-01-21 | End: 2022-02-22

## 2022-01-21 NOTE — PROGRESS NOTES
Subjective   Mohit Doherty is a 41 y.o. female.     Chief Complaint   Patient presents with   • Weight Check       She presents for follow up of being overweight and anxiety. She has weaned off the paxil and is doing well. She has started the topamax and is tolerating it well. She has lost some weight. She c/o left side pain. She doesn't know if she leaned over the crib or if the baby kicked her. She went to the chiropractor where they did combo therapy. She states they didn't adjust anything. She was in excruciating pain a couple days later. Pain is constant cull and occasionally sharp. She feels like something moves sometimes. She went to dermatology who diagnosed her with eczema and they plan to take a basal cell off her chest.            The following portions of the patient's history were reviewed and updated as appropriate: allergies, current medications, past family history, past medical history, past social history, past surgical history and problem list.    Review of Systems   Constitutional: Negative for fatigue, fever and unexpected weight change.   HENT: Negative for ear pain, rhinorrhea and sore throat.    Eyes: Negative for visual disturbance.   Respiratory: Negative for cough, chest tightness and shortness of breath.    Cardiovascular: Negative for chest pain, palpitations and leg swelling.   Gastrointestinal: Negative for abdominal pain, blood in stool, constipation, diarrhea, nausea and vomiting.   Endocrine: Negative for cold intolerance and heat intolerance.   Genitourinary: Negative for dysuria.   Musculoskeletal: Positive for arthralgias. Negative for myalgias.   Skin: Negative for color change.   Allergic/Immunologic: Negative for environmental allergies.   Hematological: Negative for adenopathy.   Psychiatric/Behavioral: Negative for suicidal ideas. The patient is not nervous/anxious.        Objective     /74 (BP Location: Left arm, Patient Position: Sitting, Cuff Size: Large Adult)    "Pulse 90   Temp 96.8 °F (36 °C) (Temporal)   Resp 18   Ht 160 cm (62.99\")   Wt 71.7 kg (158 lb)   SpO2 99%   BMI 28.00 kg/m²     Physical Exam  Vitals reviewed.   Constitutional:       General: She is not in acute distress.     Appearance: Normal appearance. She is well-developed. She is not diaphoretic.   HENT:      Head: Normocephalic and atraumatic.   Cardiovascular:      Rate and Rhythm: Normal rate and regular rhythm.      Heart sounds: Normal heart sounds, S1 normal and S2 normal. No murmur heard.  No friction rub. No gallop.    Pulmonary:      Effort: Pulmonary effort is normal. No respiratory distress.      Breath sounds: Normal breath sounds.   Chest:      Comments: Rib tenderness to palpation left ribs 8-10.   Abdominal:      General: Bowel sounds are normal. There is no distension.      Palpations: Abdomen is soft.      Tenderness: There is no abdominal tenderness.   Skin:     General: Skin is warm and dry.   Neurological:      Mental Status: She is alert and oriented to person, place, and time.   Psychiatric:         Behavior: Behavior normal.         Thought Content: Thought content normal.         Judgment: Judgment normal.         Assessment/Plan     Problem List Items Addressed This Visit     None      Visit Diagnoses     Uses birth control    -  Primary    Relevant Medications    norgestimate-ethinyl estradiol (Sprintec 28) 0.25-35 MG-MCG per tablet    Overweight        Relevant Medications    topiramate (TOPAMAX) 25 MG tablet    Rib pain on left side        Relevant Medications    methylPREDNISolone (MEDROL) 4 MG dose pack    ibuprofen (ADVIL,MOTRIN) 600 MG tablet    Other Relevant Orders    XR Ribs Left With PA Chest          Plan: She has lost 6lbs. Continue topiramate. Medrol ordered for left rib pain. Get xray. Take deep breaths to prevent pneumonia. Follow up in 3 weeks if no better.     @Body mass index is 28 kg/m².              Understands disease processes and need for medications.  " Understands reasons for urgent and emergent care.  Patient (& family) verbalized agreement for treatment plan.   Emotional support and active listening provided.  Patient provided time to verbalize feelings.               This document has been electronically signed by JOHNNY Montano   January 21, 2022 17:20 EST

## 2022-01-22 ENCOUNTER — HOSPITAL ENCOUNTER (OUTPATIENT)
Dept: GENERAL RADIOLOGY | Facility: HOSPITAL | Age: 42
Discharge: HOME | End: 2022-01-22

## 2022-01-22 DIAGNOSIS — R07.81 RIB PAIN ON LEFT SIDE: ICD-10-CM

## 2022-01-22 PROCEDURE — 71101 X-RAY EXAM UNILAT RIBS/CHEST: CPT | Performed by: RADIOLOGY

## 2022-01-22 PROCEDURE — 71101 X-RAY EXAM UNILAT RIBS/CHEST: CPT

## 2022-01-25 DIAGNOSIS — E66.3 OVERWEIGHT: ICD-10-CM

## 2022-01-25 RX ORDER — TOPIRAMATE 25 MG/1
25 TABLET ORAL 2 TIMES DAILY
Qty: 60 TABLET | Refills: 1 | Status: SHIPPED | OUTPATIENT
Start: 2022-01-25 | End: 2022-05-17 | Stop reason: SDUPTHER

## 2022-02-22 ENCOUNTER — OFFICE VISIT (OUTPATIENT)
Dept: PSYCHIATRY | Facility: CLINIC | Age: 42
End: 2022-02-22

## 2022-02-22 VITALS
TEMPERATURE: 96.9 F | DIASTOLIC BLOOD PRESSURE: 81 MMHG | BODY MASS INDEX: 27.71 KG/M2 | HEIGHT: 63 IN | SYSTOLIC BLOOD PRESSURE: 118 MMHG | HEART RATE: 103 BPM | WEIGHT: 156.4 LBS | OXYGEN SATURATION: 98 %

## 2022-02-22 DIAGNOSIS — F41.1 GENERALIZED ANXIETY DISORDER: Primary | ICD-10-CM

## 2022-02-22 PROCEDURE — 90792 PSYCH DIAG EVAL W/MED SRVCS: CPT

## 2022-02-22 RX ORDER — ESCITALOPRAM OXALATE 5 MG/1
TABLET ORAL
Qty: 46 TABLET | Refills: 0 | Status: SHIPPED | OUTPATIENT
Start: 2022-02-22 | End: 2022-03-31 | Stop reason: SDUPTHER
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
## 2022-02-25 ENCOUNTER — PATIENT ROUNDING (BHMG ONLY) (OUTPATIENT)
Dept: FAMILY MEDICINE CLINIC | Facility: CLINIC | Age: 42
End: 2022-02-25

## 2022-02-25 NOTE — PROGRESS NOTES
February 25, 2022    Hello, may I speak with Mohit Doherty?    My name is JESSIE MORA    I am  with [facility] [place]  [facility]  [address] [place] [address] [phone].    Before we get started may I verify your date of birth? 1980    I am calling to officially welcome you to our practice and ask about your recent visit. Is this a good time to talk?  YES    Tell me about your visit with us. What things went well?    MY VISIT WAS GREAT     We're always looking for ways to make our patients' experiences even better. Do you have recommendations on ways we may improve?    NO, EVERYTHING WAS GREAT.    Overall were you satisfied with your first visit to our practice? YES     I appreciate you taking the time to speak with me today. Is there anything else I can do for you?   NO      Thank you, and have a great day.

## 2022-03-31 DIAGNOSIS — F41.1 GENERALIZED ANXIETY DISORDER: ICD-10-CM

## 2022-03-31 RX ORDER — ESCITALOPRAM OXALATE 10 MG/1
10 TABLET ORAL DAILY
Qty: 30 TABLET | Refills: 0 | Status: SHIPPED | OUTPATIENT
Start: 2022-03-31 | End: 2022-06-07

## 2022-05-17 ENCOUNTER — TELEMEDICINE (OUTPATIENT)
Dept: FAMILY MEDICINE CLINIC | Facility: CLINIC | Age: 42
End: 2022-05-17

## 2022-05-17 DIAGNOSIS — E66.3 OVERWEIGHT: ICD-10-CM

## 2022-05-17 DIAGNOSIS — G43.019 INTRACTABLE MIGRAINE WITHOUT AURA AND WITHOUT STATUS MIGRAINOSUS: Primary | ICD-10-CM

## 2022-05-17 PROCEDURE — 99213 OFFICE O/P EST LOW 20 MIN: CPT | Performed by: NURSE PRACTITIONER

## 2022-05-17 RX ORDER — TOPIRAMATE 25 MG/1
25 TABLET ORAL 2 TIMES DAILY
Qty: 60 TABLET | Refills: 1 | Status: SHIPPED | OUTPATIENT
Start: 2022-05-17 | End: 2022-10-13

## 2022-05-17 NOTE — PROGRESS NOTES
Subjective   Mohit Doherty is a 41 y.o. female.     Chief Complaint   Patient presents with   • Headache       She presents via video visit with c/o migraines. She states they last from 24-48 hours. She states tylenol and ibuprofen don't really help. She just has to wait them out. She states she was on topamax for migraines when she was in her early 20s but stopped it for her child. She c/o nausea with light and sound sensitivity.        The following portions of the patient's history were reviewed and updated as appropriate: allergies, current medications, past family history, past medical history, past social history, past surgical history and problem list.    Review of Systems   Constitutional: Negative for fatigue, fever and unexpected weight change.   HENT: Negative for ear pain, rhinorrhea and sore throat.    Eyes: Negative for visual disturbance.   Respiratory: Negative for cough, chest tightness and shortness of breath.    Cardiovascular: Negative for chest pain, palpitations and leg swelling.   Gastrointestinal: Positive for nausea. Negative for abdominal pain, blood in stool, constipation, diarrhea and vomiting.   Endocrine: Negative for cold intolerance and heat intolerance.   Genitourinary: Negative for dysuria.   Musculoskeletal: Negative for arthralgias and myalgias.   Skin: Negative for color change.   Allergic/Immunologic: Negative for environmental allergies.   Neurological: Positive for headaches.   Hematological: Negative for adenopathy.   Psychiatric/Behavioral: Negative for suicidal ideas. The patient is not nervous/anxious.        Objective     There were no vitals taken for this visit.    Physical Exam  Constitutional:       General: She is not in acute distress.     Appearance: Normal appearance. She is not ill-appearing.   HENT:      Head: Normocephalic and atraumatic.   Pulmonary:      Effort: Pulmonary effort is normal.   Neurological:      General: No focal deficit present.      Mental  Status: She is alert and oriented to person, place, and time.   Psychiatric:         Mood and Affect: Mood normal.         Behavior: Behavior normal.         Thought Content: Thought content normal.         Judgment: Judgment normal.         Current Outpatient Medications   Medication Sig Dispense Refill   • topiramate (TOPAMAX) 25 MG tablet Take 1 tablet by mouth 2 (Two) Times a Day. 60 tablet 1   • cetirizine (zyrTEC) 10 MG tablet Take 1 tablet by mouth Daily. 30 tablet 1   • escitalopram (Lexapro) 10 MG tablet Take 1 tablet by mouth Daily for 30 days. 30 tablet 0   • Multiple Vitamins-Minerals (MULTIVITAMIN GUMMIES ADULT PO) Take  by mouth.     • norgestimate-ethinyl estradiol (Sprintec 28) 0.25-35 MG-MCG per tablet Take 1 tablet by mouth Daily. 28 tablet 12   • spironolactone (Aldactone) 100 MG tablet Take 0.5 tablets by mouth 3 (Three) Times a Day. 45 tablet 5     No current facility-administered medications for this visit.            Assessment & Plan     Problem List Items Addressed This Visit    None     Visit Diagnoses     Overweight        Relevant Medications    topiramate (TOPAMAX) 25 MG tablet            ICD-10-CM ICD-9-CM   1. Overweight  E66.3 278.02       Plan: Maybe the topamax has triggered the migraines. Let's gradually decrease the dose over time and then stop the topamax. Try 25mg twice a day for two weeks, then 25mg once a day for two weeks, then 1/2 tablet daily for two weeks, then stop. Follow up in 6 weeks and as needed.    @There is no height or weight on file to calculate BMI.              Understands disease processes and need for medications.  Understands reasons for urgent and emergent care.  Patient (& family) verbalized agreement for treatment plan.   Emotional support and active listening provided.  Patient provided time to verbalize feelings.           BMI is >= 25 and < 30. (Overweight) The following options were offered after discussion: weight loss educational material (shared in  after visit summary)      This document has been electronically signed by JOHNNY Montano   May 17, 2022 15:36 EDT

## 2022-06-07 DIAGNOSIS — F41.1 GENERALIZED ANXIETY DISORDER: ICD-10-CM

## 2022-06-07 RX ORDER — ESCITALOPRAM OXALATE 10 MG/1
TABLET ORAL
Qty: 30 TABLET | Refills: 0 | Status: SHIPPED | OUTPATIENT
Start: 2022-06-07 | End: 2022-07-05 | Stop reason: SDUPTHER

## 2022-07-05 ENCOUNTER — OFFICE VISIT (OUTPATIENT)
Dept: FAMILY MEDICINE CLINIC | Facility: CLINIC | Age: 42
End: 2022-07-05

## 2022-07-05 VITALS
BODY MASS INDEX: 28.17 KG/M2 | DIASTOLIC BLOOD PRESSURE: 82 MMHG | RESPIRATION RATE: 18 BRPM | HEIGHT: 63 IN | SYSTOLIC BLOOD PRESSURE: 123 MMHG | OXYGEN SATURATION: 99 % | HEART RATE: 99 BPM | TEMPERATURE: 98.2 F | WEIGHT: 159 LBS

## 2022-07-05 DIAGNOSIS — B02.8 HERPES ZOSTER WITH COMPLICATION: Primary | ICD-10-CM

## 2022-07-05 DIAGNOSIS — M13.0 POLYARTHRITIS: ICD-10-CM

## 2022-07-05 DIAGNOSIS — F41.1 GENERALIZED ANXIETY DISORDER: ICD-10-CM

## 2022-07-05 PROCEDURE — 99213 OFFICE O/P EST LOW 20 MIN: CPT | Performed by: NURSE PRACTITIONER

## 2022-07-05 RX ORDER — ESCITALOPRAM OXALATE 10 MG/1
10 TABLET ORAL DAILY
Qty: 30 TABLET | Refills: 0 | Status: SHIPPED | OUTPATIENT
Start: 2022-07-05 | End: 2022-08-05

## 2022-07-05 RX ORDER — ACYCLOVIR 800 MG/1
800 TABLET ORAL 4 TIMES DAILY
Qty: 40 TABLET | Refills: 0 | Status: SHIPPED | OUTPATIENT
Start: 2022-07-05 | End: 2022-10-13

## 2022-07-05 RX ORDER — LIDOCAINE 50 MG/G
1 OINTMENT TOPICAL
Qty: 240 G | Refills: 0 | Status: SHIPPED | OUTPATIENT
Start: 2022-07-05 | End: 2022-10-13

## 2022-07-05 NOTE — PROGRESS NOTES
Subjective   Mohit Doherty is a 41 y.o. female.     Chief Complaint   Patient presents with   • Herpes Zoster       She presents with c/o shingles on her left shoulder that started about 4 days ago. She has been using cream and taking valtrex but the valtrex make her sick. She states the valtrex doesn't seem to help that much. She c/o pain and fatigue. She states she barely can get up in the morning and wants to sleep through the day. She also c/o symptoms of fibromyalgia. She has not had the vaccine. She requests a referral to [facility]. She states she woke up one night this summer and her hands were swollen. She tried soap and ice water and couldn't get her rings off. She had to have her rings cut off the next day. She had not gained weight, she was just swollen. She had not eaten a lot of sodium that day. She states she wasn't having joint pain and she wasn't hot. She states she had to get her rings sized up. She is taking a diuretic. She c/o feeling sore somewhere all the time. She states it is mostly shoulders and neck but sometimes it is legs and feet. She states the feet pain is worse if she is on her feet. She states she can't clean her whole house in one day. She can only do the upstairs or the downstairs. Pain improves when she gets off her feet.        The following portions of the patient's history were reviewed and updated as appropriate: allergies, current medications, past family history, past medical history, past social history, past surgical history and problem list.    Review of Systems   Constitutional: Positive for fever. Negative for fatigue and unexpected weight change.   HENT: Negative for ear pain, rhinorrhea and sore throat.    Eyes: Negative for visual disturbance.   Respiratory: Negative for cough, chest tightness and shortness of breath.    Cardiovascular: Negative for chest pain, palpitations and leg swelling.   Gastrointestinal: Negative for abdominal pain, blood in  "stool, constipation, diarrhea, nausea and vomiting.   Endocrine: Negative for cold intolerance and heat intolerance.   Genitourinary: Negative for dysuria.   Musculoskeletal: Positive for arthralgias, myalgias, neck pain and neck stiffness.   Skin: Positive for rash. Negative for color change.   Allergic/Immunologic: Negative for environmental allergies.   Hematological: Negative for adenopathy.   Psychiatric/Behavioral: Negative for suicidal ideas. The patient is not nervous/anxious.        Objective     /82 (BP Location: Left arm, Patient Position: Sitting, Cuff Size: Adult)   Pulse 99   Temp 98.2 °F (36.8 °C) (Temporal)   Resp 18   Ht 160 cm (62.99\")   Wt 72.1 kg (159 lb)   SpO2 99%   BMI 28.17 kg/m²     Physical Exam  Vitals reviewed.   Constitutional:       Appearance: Normal appearance.   Pulmonary:      Effort: Pulmonary effort is normal. No respiratory distress.   Skin:     Findings: Rash present.      Comments: Erythematous vesicular rash left shoulder.   Neurological:      General: No focal deficit present.      Mental Status: She is alert and oriented to person, place, and time.   Psychiatric:         Mood and Affect: Mood normal.         Behavior: Behavior normal.         Current Outpatient Medications   Medication Sig Dispense Refill   • cetirizine (zyrTEC) 10 MG tablet Take 1 tablet by mouth Daily. 30 tablet 1   • escitalopram (LEXAPRO) 10 MG tablet Take 1 tablet by mouth Daily. FOR 30 DAYS 30 tablet 0   • Multiple Vitamins-Minerals (MULTIVITAMIN GUMMIES ADULT PO) Take  by mouth.     • norgestimate-ethinyl estradiol (Sprintec 28) 0.25-35 MG-MCG per tablet Take 1 tablet by mouth Daily. 28 tablet 12   • spironolactone (Aldactone) 100 MG tablet Take 0.5 tablets by mouth 3 (Three) Times a Day. 45 tablet 5   • topiramate (TOPAMAX) 25 MG tablet Take 1 tablet by mouth 2 (Two) Times a Day. 60 tablet 1   • acyclovir (ZOVIRAX) 800 MG tablet Take 1 tablet by mouth 4 (Four) Times a Day. 40 tablet 0 "   • lidocaine (XYLOCAINE) 5 % ointment Apply 1 application topically to the appropriate area as directed Every 2 (Two) Hours As Needed for Mild Pain . 240 g 0     No current facility-administered medications for this visit.            Assessment & Plan     Problem List Items Addressed This Visit    None     Visit Diagnoses     Herpes zoster with complication    -  Primary    Relevant Medications    acyclovir (ZOVIRAX) 800 MG tablet    lidocaine (XYLOCAINE) 5 % ointment    Generalized anxiety disorder        Relevant Medications    escitalopram (LEXAPRO) 10 MG tablet    Polyarthritis        Relevant Orders    Ambulatory Referral to Rheumatology (Completed)            ICD-10-CM ICD-9-CM   1. Herpes zoster with complication  B02.8 053.8   2. Generalized anxiety disorder  F41.1 300.02   3. Polyarthritis  M13.0 716.50     Plan: Acyclovir ordered for shingles. Get vaccine when rash has cleared. Refer to [place] [facility] for joint and muscle pain per her request. Keep scheduled follow up and follow up as needed.     @Body mass index is 28.17 kg/m².              Understands disease processes and need for medications.  Understands reasons for urgent and emergent care.  Patient (& family) verbalized agreement for treatment plan.   Emotional support and active listening provided.  Patient provided time to verbalize feelings.           BMI is >= 25 and <30. (Overweight) The following options were offered after discussion;: weight loss educational material (shared in after visit summary)      This document has been electronically signed by JOHNNY Montano   July 6, 2022 09:50 EDT

## 2022-08-04 DIAGNOSIS — F41.1 GENERALIZED ANXIETY DISORDER: ICD-10-CM

## 2022-08-05 RX ORDER — ESCITALOPRAM OXALATE 10 MG/1
TABLET ORAL
Qty: 30 TABLET | Refills: 5 | Status: SHIPPED | OUTPATIENT
Start: 2022-08-05 | End: 2023-01-16

## 2022-08-10 ENCOUNTER — OFFICE VISIT (OUTPATIENT)
Dept: FAMILY MEDICINE CLINIC | Facility: CLINIC | Age: 42
End: 2022-08-10

## 2022-08-10 VITALS
TEMPERATURE: 97.5 F | HEIGHT: 63 IN | BODY MASS INDEX: 28.7 KG/M2 | OXYGEN SATURATION: 98 % | HEART RATE: 81 BPM | WEIGHT: 162 LBS | RESPIRATION RATE: 18 BRPM | SYSTOLIC BLOOD PRESSURE: 102 MMHG | DIASTOLIC BLOOD PRESSURE: 70 MMHG

## 2022-08-10 DIAGNOSIS — B02.9 HERPES ZOSTER WITHOUT COMPLICATION: Primary | ICD-10-CM

## 2022-08-10 PROCEDURE — 86787 VARICELLA-ZOSTER ANTIBODY: CPT | Performed by: NURSE PRACTITIONER

## 2022-08-10 PROCEDURE — 99213 OFFICE O/P EST LOW 20 MIN: CPT | Performed by: NURSE PRACTITIONER

## 2022-08-10 RX ORDER — VALACYCLOVIR HYDROCHLORIDE 1 G/1
1000 TABLET, FILM COATED ORAL 2 TIMES DAILY
Qty: 20 TABLET | Refills: 0 | Status: SHIPPED | OUTPATIENT
Start: 2022-08-10 | End: 2022-10-13

## 2022-08-10 RX ORDER — PREDNISONE 5 MG/1
1 TABLET ORAL DAILY
Qty: 1 EACH | Refills: 0 | Status: SHIPPED | OUTPATIENT
Start: 2022-08-10 | End: 2022-10-13

## 2022-08-10 NOTE — PROGRESS NOTES
Venipuncture Blood Specimen Collection  Venipuncture performed in right arm by Rosina Raza MA with good hemostasis. Patient tolerated the procedure well without complications.   08/10/22   Rosina Raza MA

## 2022-08-10 NOTE — PROGRESS NOTES
"Subjective   Mohit Doherty is a 42 y.o. female.     Chief Complaint   Patient presents with   • Herpes Zoster     Shingles        She presents with c/o worsening shingles over the past month. She states it is spreading. She c/o feeling exhausted. She states every time she gets a headache and starts feeling bad, she develops another spot of the rash.        The following portions of the patient's history were reviewed and updated as appropriate: allergies, current medications, past family history, past medical history, past social history, past surgical history and problem list.    Review of Systems   Constitutional: Positive for fatigue. Negative for fever and unexpected weight change.   HENT: Negative for ear pain, rhinorrhea and sore throat.    Eyes: Negative for visual disturbance.   Respiratory: Negative for cough, chest tightness and shortness of breath.    Cardiovascular: Negative for chest pain, palpitations and leg swelling.   Gastrointestinal: Negative for abdominal pain, blood in stool, constipation, diarrhea, nausea and vomiting.   Endocrine: Negative for cold intolerance and heat intolerance.   Genitourinary: Negative for dysuria.   Musculoskeletal: Negative for arthralgias and myalgias.   Skin: Positive for rash. Negative for color change.   Allergic/Immunologic: Negative for environmental allergies.   Neurological: Positive for headaches.   Hematological: Negative for adenopathy.   Psychiatric/Behavioral: Negative for suicidal ideas. The patient is not nervous/anxious.        Objective     /70 (BP Location: Left arm, Patient Position: Sitting, Cuff Size: Adult)   Pulse 81   Temp 97.5 °F (36.4 °C) (Temporal)   Resp 18   Ht 160 cm (62.99\")   Wt 73.5 kg (162 lb)   SpO2 98%   BMI 28.70 kg/m²     Physical Exam  Vitals reviewed.   Constitutional:       Appearance: Normal appearance.   HENT:      Head: Normocephalic and atraumatic.   Pulmonary:      Effort: Pulmonary effort is normal. No " respiratory distress.   Skin:     Comments: Erythematous vesicular rash scattered on left shoulder and left upper back. Two spots on right upper back and shoulder.   Neurological:      Mental Status: She is alert.   Psychiatric:         Mood and Affect: Mood normal.         Behavior: Behavior normal.         Current Outpatient Medications   Medication Sig Dispense Refill   • acyclovir (ZOVIRAX) 800 MG tablet Take 1 tablet by mouth 4 (Four) Times a Day. 40 tablet 0   • cetirizine (zyrTEC) 10 MG tablet Take 1 tablet by mouth Daily. 30 tablet 1   • escitalopram (LEXAPRO) 10 MG tablet Take 1 tablet by mouth once daily for 30 days 30 tablet 5   • lidocaine (XYLOCAINE) 5 % ointment Apply 1 application topically to the appropriate area as directed Every 2 (Two) Hours As Needed for Mild Pain . 240 g 0   • Multiple Vitamins-Minerals (MULTIVITAMIN GUMMIES ADULT PO) Take  by mouth.     • norgestimate-ethinyl estradiol (Sprintec 28) 0.25-35 MG-MCG per tablet Take 1 tablet by mouth Daily. 28 tablet 12   • spironolactone (Aldactone) 100 MG tablet Take 0.5 tablets by mouth 3 (Three) Times a Day. 45 tablet 5   • topiramate (TOPAMAX) 25 MG tablet Take 1 tablet by mouth 2 (Two) Times a Day. 60 tablet 1   • predniSONE 5 MG (48) tablet therapy pack dose pack Take 1 tablet by mouth Daily. Take as directed on package instructions. 1 each 0   • valACYclovir (Valtrex) 1000 MG tablet Take 1 tablet by mouth 2 (Two) Times a Day. 20 tablet 0     No current facility-administered medications for this visit.            Assessment & Plan     Problem List Items Addressed This Visit    None     Visit Diagnoses     Herpes zoster without complication    -  Primary    Relevant Medications    valACYclovir (Valtrex) 1000 MG tablet    predniSONE 5 MG (48) tablet therapy pack dose pack    Other Relevant Orders    Varicella zoster antibody, IgG            ICD-10-CM ICD-9-CM   1. Herpes zoster without complication  B02.9 053.9     Plan: Herpes do not usually  affect dermatomes on both sides of the body. Get zoster IgG. Valcyclovir and pred pack ordered. Follow up pending results.     @Body mass index is 28.7 kg/m².                Understands disease processes and need for medications.  Understands reasons for urgent and emergent care.  Patient (& family) verbalized agreement for treatment plan.   Emotional support and active listening provided.  Patient provided time to verbalize feelings.           BMI is >= 25 and <30. (Overweight) The following options were offered after discussion;: weight loss educational material (shared in after visit summary)      This document has been electronically signed by JOHNNY Montano   August 10, 2022 16:12 EDT

## 2022-08-12 DIAGNOSIS — B02.8 HERPES ZOSTER WITH OTHER COMPLICATION: Primary | ICD-10-CM

## 2022-08-12 LAB — VZV IGG SER IA-ACNC: 3193 INDEX

## 2022-08-18 DIAGNOSIS — Z78.9 USES BIRTH CONTROL: ICD-10-CM

## 2022-08-18 RX ORDER — NORGESTIMATE AND ETHINYL ESTRADIOL 0.25-0.035
1 KIT ORAL DAILY
Qty: 28 TABLET | Refills: 12
Start: 2022-08-18

## 2022-09-29 DIAGNOSIS — E66.9 OBESITY (BMI 30-39.9): Primary | ICD-10-CM

## 2022-09-29 RX ORDER — SEMAGLUTIDE 1.34 MG/ML
0.25 INJECTION, SOLUTION SUBCUTANEOUS WEEKLY
Qty: 1.5 ML | Refills: 0 | Status: SHIPPED | OUTPATIENT
Start: 2022-09-29 | End: 2022-09-30

## 2022-09-30 ENCOUNTER — PRIOR AUTHORIZATION (OUTPATIENT)
Dept: FAMILY MEDICINE CLINIC | Facility: CLINIC | Age: 42
End: 2022-09-30

## 2022-09-30 DIAGNOSIS — E66.9 OBESITY (BMI 30-39.9): Primary | ICD-10-CM

## 2022-09-30 RX ORDER — TIRZEPATIDE 2.5 MG/.5ML
2.5 INJECTION, SOLUTION SUBCUTANEOUS WEEKLY
Qty: 0.5 ML | Refills: 0 | Status: SHIPPED | OUTPATIENT
Start: 2022-09-30 | End: 2022-10-13

## 2022-10-09 ENCOUNTER — APPOINTMENT (OUTPATIENT)
Dept: CT IMAGING | Facility: HOSPITAL | Age: 42
End: 2022-10-09

## 2022-10-09 ENCOUNTER — HOSPITAL ENCOUNTER (EMERGENCY)
Facility: HOSPITAL | Age: 42
Discharge: HOME | End: 2022-10-09
Attending: FAMILY MEDICINE | Admitting: FAMILY MEDICINE

## 2022-10-09 VITALS
HEIGHT: 62 IN | OXYGEN SATURATION: 99 % | HEART RATE: 87 BPM | BODY MASS INDEX: 29.55 KG/M2 | WEIGHT: 160.6 LBS | TEMPERATURE: 97.5 F | RESPIRATION RATE: 16 BRPM | SYSTOLIC BLOOD PRESSURE: 119 MMHG | DIASTOLIC BLOOD PRESSURE: 74 MMHG

## 2022-10-09 DIAGNOSIS — R19.7 DIARRHEA, UNSPECIFIED TYPE: Primary | ICD-10-CM

## 2022-10-09 LAB
027 TOXIN: NORMAL
ADV 40+41 DNA STL QL NAA+NON-PROBE: NOT DETECTED
ALBUMIN SERPL-MCNC: 4.62 G/DL (ref 3.5–5.2)
ALBUMIN/GLOB SERPL: 1.5 G/DL
ALP SERPL-CCNC: 115 U/L (ref 39–117)
ALT SERPL W P-5'-P-CCNC: 10 U/L (ref 1–33)
ANION GAP SERPL CALCULATED.3IONS-SCNC: 12.7 MMOL/L (ref 5–15)
AST SERPL-CCNC: 18 U/L (ref 1–32)
ASTRO TYP 1-8 RNA STL QL NAA+NON-PROBE: NOT DETECTED
BACTERIA UR QL AUTO: ABNORMAL /HPF
BASOPHILS # BLD AUTO: 0.02 10*3/MM3 (ref 0–0.2)
BASOPHILS NFR BLD AUTO: 0.2 % (ref 0–1.5)
BILIRUB SERPL-MCNC: 0.8 MG/DL (ref 0–1.2)
BILIRUB UR QL STRIP: ABNORMAL
BUN SERPL-MCNC: 9 MG/DL (ref 6–20)
BUN/CREAT SERPL: 14.1 (ref 7–25)
C CAYETANENSIS DNA STL QL NAA+NON-PROBE: NOT DETECTED
C COLI+JEJ+UPSA DNA STL QL NAA+NON-PROBE: NOT DETECTED
C DIFF TOX GENS STL QL NAA+PROBE: NEGATIVE
CALCIUM SPEC-SCNC: 9.2 MG/DL (ref 8.6–10.5)
CHLORIDE SERPL-SCNC: 105 MMOL/L (ref 98–107)
CLARITY UR: CLEAR
CO2 SERPL-SCNC: 21.3 MMOL/L (ref 22–29)
COLOR UR: ABNORMAL
CREAT SERPL-MCNC: 0.64 MG/DL (ref 0.57–1)
CRP SERPL-MCNC: 0.92 MG/DL (ref 0–0.5)
CRYPTOSP DNA STL QL NAA+NON-PROBE: NOT DETECTED
D-LACTATE SERPL-SCNC: 0.9 MMOL/L (ref 0.5–2)
DEPRECATED RDW RBC AUTO: 47.6 FL (ref 37–54)
E HISTOLYT DNA STL QL NAA+NON-PROBE: NOT DETECTED
EAEC PAA PLAS AGGR+AATA ST NAA+NON-PRB: NOT DETECTED
EC STX1+STX2 GENES STL QL NAA+NON-PROBE: NOT DETECTED
EGFRCR SERPLBLD CKD-EPI 2021: 114 ML/MIN/1.73
EOSINOPHIL # BLD AUTO: 0.13 10*3/MM3 (ref 0–0.4)
EOSINOPHIL NFR BLD AUTO: 1 % (ref 0.3–6.2)
EPEC EAE GENE STL QL NAA+NON-PROBE: NOT DETECTED
ERYTHROCYTE [DISTWIDTH] IN BLOOD BY AUTOMATED COUNT: 14.9 % (ref 12.3–15.4)
ETEC LTA+ST1A+ST1B TOX ST NAA+NON-PROBE: NOT DETECTED
G LAMBLIA DNA STL QL NAA+NON-PROBE: NOT DETECTED
GLOBULIN UR ELPH-MCNC: 3.2 GM/DL
GLUCOSE SERPL-MCNC: 124 MG/DL (ref 65–99)
GLUCOSE UR STRIP-MCNC: NEGATIVE MG/DL
HCG SERPL QL: NEGATIVE
HCT VFR BLD AUTO: 40.2 % (ref 34–46.6)
HGB BLD-MCNC: 14.2 G/DL (ref 12–15.9)
HGB UR QL STRIP.AUTO: ABNORMAL
HOLD SPECIMEN: NORMAL
HYALINE CASTS UR QL AUTO: ABNORMAL /LPF
IMM GRANULOCYTES # BLD AUTO: 0.05 10*3/MM3 (ref 0–0.05)
IMM GRANULOCYTES NFR BLD AUTO: 0.4 % (ref 0–0.5)
INR PPP: 1.11 (ref 0.9–1.1)
KETONES UR QL STRIP: ABNORMAL
LEUKOCYTE ESTERASE UR QL STRIP.AUTO: ABNORMAL
LIPASE SERPL-CCNC: 16 U/L (ref 13–60)
LYMPHOCYTES # BLD AUTO: 1.13 10*3/MM3 (ref 0.7–3.1)
LYMPHOCYTES NFR BLD AUTO: 8.8 % (ref 19.6–45.3)
MAGNESIUM SERPL-MCNC: 2 MG/DL (ref 1.6–2.6)
MCH RBC QN AUTO: 31 PG (ref 26.6–33)
MCHC RBC AUTO-ENTMCNC: 35.3 G/DL (ref 31.5–35.7)
MCV RBC AUTO: 87.8 FL (ref 79–97)
MONOCYTES # BLD AUTO: 0.68 10*3/MM3 (ref 0.1–0.9)
MONOCYTES NFR BLD AUTO: 5.3 % (ref 5–12)
NEUTROPHILS NFR BLD AUTO: 10.87 10*3/MM3 (ref 1.7–7)
NEUTROPHILS NFR BLD AUTO: 84.3 % (ref 42.7–76)
NITRITE UR QL STRIP: NEGATIVE
NOROVIRUS GI+II RNA STL QL NAA+NON-PROBE: NOT DETECTED
NRBC BLD AUTO-RTO: 0 /100 WBC (ref 0–0.2)
P SHIGELLOIDES DNA STL QL NAA+NON-PROBE: NOT DETECTED
PH UR STRIP.AUTO: 5.5 [PH] (ref 5–8)
PLATELET # BLD AUTO: 424 10*3/MM3 (ref 140–450)
PMV BLD AUTO: 8.5 FL (ref 6–12)
POTASSIUM SERPL-SCNC: 4 MMOL/L (ref 3.5–5.2)
PROT SERPL-MCNC: 7.8 G/DL (ref 6–8.5)
PROT UR QL STRIP: NEGATIVE
PROTHROMBIN TIME: 14.5 SECONDS (ref 12.1–14.7)
QT INTERVAL: 398 MS
QTC INTERVAL: 450 MS
RBC # BLD AUTO: 4.58 10*6/MM3 (ref 3.77–5.28)
RBC # UR STRIP: ABNORMAL /HPF
REF LAB TEST METHOD: ABNORMAL
RVA RNA STL QL NAA+NON-PROBE: NOT DETECTED
S ENT+BONG DNA STL QL NAA+NON-PROBE: NOT DETECTED
SAPO I+II+IV+V RNA STL QL NAA+NON-PROBE: NOT DETECTED
SHIGELLA SP+EIEC IPAH ST NAA+NON-PROBE: NOT DETECTED
SODIUM SERPL-SCNC: 139 MMOL/L (ref 136–145)
SP GR UR STRIP: 1.02 (ref 1–1.03)
SQUAMOUS #/AREA URNS HPF: ABNORMAL /HPF
UROBILINOGEN UR QL STRIP: ABNORMAL
V CHOL+PARA+VUL DNA STL QL NAA+NON-PROBE: NOT DETECTED
V CHOLERAE DNA STL QL NAA+NON-PROBE: NOT DETECTED
WBC # UR STRIP: ABNORMAL /HPF
WBC NRBC COR # BLD: 12.88 10*3/MM3 (ref 3.4–10.8)
WHOLE BLOOD HOLD COAG: NORMAL
WHOLE BLOOD HOLD SPECIMEN: NORMAL
Y ENTEROCOL DNA STL QL NAA+NON-PROBE: NOT DETECTED

## 2022-10-09 PROCEDURE — 85610 PROTHROMBIN TIME: CPT | Performed by: FAMILY MEDICINE

## 2022-10-09 PROCEDURE — 93005 ELECTROCARDIOGRAM TRACING: CPT | Performed by: FAMILY MEDICINE

## 2022-10-09 PROCEDURE — 83690 ASSAY OF LIPASE: CPT | Performed by: FAMILY MEDICINE

## 2022-10-09 PROCEDURE — 93010 ELECTROCARDIOGRAM REPORT: CPT | Performed by: INTERNAL MEDICINE

## 2022-10-09 PROCEDURE — 99283 EMERGENCY DEPT VISIT LOW MDM: CPT

## 2022-10-09 PROCEDURE — 87493 C DIFF AMPLIFIED PROBE: CPT | Performed by: FAMILY MEDICINE

## 2022-10-09 PROCEDURE — 83735 ASSAY OF MAGNESIUM: CPT | Performed by: FAMILY MEDICINE

## 2022-10-09 PROCEDURE — 80053 COMPREHEN METABOLIC PANEL: CPT | Performed by: FAMILY MEDICINE

## 2022-10-09 PROCEDURE — 87507 IADNA-DNA/RNA PROBE TQ 12-25: CPT | Performed by: FAMILY MEDICINE

## 2022-10-09 PROCEDURE — 84703 CHORIONIC GONADOTROPIN ASSAY: CPT | Performed by: FAMILY MEDICINE

## 2022-10-09 PROCEDURE — 81001 URINALYSIS AUTO W/SCOPE: CPT | Performed by: FAMILY MEDICINE

## 2022-10-09 PROCEDURE — 86140 C-REACTIVE PROTEIN: CPT | Performed by: FAMILY MEDICINE

## 2022-10-09 PROCEDURE — 36415 COLL VENOUS BLD VENIPUNCTURE: CPT

## 2022-10-09 PROCEDURE — 25010000002 IOPAMIDOL 61 % SOLUTION: Performed by: FAMILY MEDICINE

## 2022-10-09 PROCEDURE — 83605 ASSAY OF LACTIC ACID: CPT | Performed by: FAMILY MEDICINE

## 2022-10-09 PROCEDURE — 74177 CT ABD & PELVIS W/CONTRAST: CPT

## 2022-10-09 PROCEDURE — 85025 COMPLETE CBC W/AUTO DIFF WBC: CPT | Performed by: FAMILY MEDICINE

## 2022-10-09 RX ORDER — LOPERAMIDE HYDROCHLORIDE 2 MG/1
2 CAPSULE ORAL 2 TIMES DAILY PRN
Qty: 10 CAPSULE | Refills: 0 | Status: SHIPPED | OUTPATIENT
Start: 2022-10-09

## 2022-10-09 RX ORDER — SODIUM CHLORIDE 0.9 % (FLUSH) 0.9 %
10 SYRINGE (ML) INJECTION AS NEEDED
Status: DISCONTINUED | OUTPATIENT
Start: 2022-10-09 | End: 2022-10-09 | Stop reason: HOSPADM

## 2022-10-09 RX ADMIN — IOPAMIDOL 65 ML: 612 INJECTION, SOLUTION INTRAVENOUS at 09:01

## 2022-10-09 RX ADMIN — SODIUM CHLORIDE 1000 ML: 9 INJECTION, SOLUTION INTRAVENOUS at 08:14
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
## 2022-10-13 ENCOUNTER — TELEMEDICINE (OUTPATIENT)
Dept: FAMILY MEDICINE CLINIC | Facility: CLINIC | Age: 42
End: 2022-10-13

## 2022-10-13 DIAGNOSIS — H10.022 PINK EYE DISEASE OF LEFT EYE: Primary | ICD-10-CM

## 2022-10-13 DIAGNOSIS — R19.7 DIARRHEA, UNSPECIFIED TYPE: ICD-10-CM

## 2022-10-13 PROCEDURE — 99213 OFFICE O/P EST LOW 20 MIN: CPT | Performed by: NURSE PRACTITIONER

## 2022-10-13 RX ORDER — ERYTHROMYCIN 5 MG/G
OINTMENT OPHTHALMIC EVERY 6 HOURS
Qty: 1 EACH | Refills: 0 | Status: SHIPPED | OUTPATIENT
Start: 2022-10-13 | End: 2023-01-09

## 2022-10-13 NOTE — PROGRESS NOTES
Subjective   Mohit Doherty is a 42 y.o. female.     Chief Complaint   Patient presents with   • Diarrhea       History of Present Illness  She presents for ER follow up of diarrhea. She states she took ozempic at a day spa and the next day she developed diarrhea. That evening it got worse and she was having diarrhea every few minutes and it wouldn't stop. She went to [facility] morning. They gave her IV fluids and ran tests. She was told to take imodium which she did. She was having diarrhea 30 times a day and it was bile. She was drinking as much as she could but it was coming out. Two mornings ago she developed stomach pain and nausea and vomiting. She went to  ER who ran more tests and gave her two rounds of fluid, potassium, and zofran. Both ERs determined it was the shot that caused the diarrhea. Last Wednesday she woke up and one of her eyes was really red. She did an online visit and got ocuflox. Her eyes seemed to be getting better. She tried to wear contacts again yesterday and she woke up with her left eye swollen and red and it was shut with drainage this morning. She denies vision. She is still using the ocuflox. She did stop when she had diarrhea. She states the diarrhea has resolved now. She states she is not having anymore abdominal pain and she was able to eat a little more normal yesterday.        The following portions of the patient's history were reviewed and updated as appropriate: allergies, current medications, past family history, past medical history, past social history, past surgical history and problem list.    Review of Systems   Constitutional: Positive for chills. Negative for fatigue, fever and unexpected weight change.   HENT: Negative for ear pain, rhinorrhea and sore throat.    Eyes: Positive for pain, discharge and redness. Negative for visual disturbance.   Respiratory: Negative for cough, chest tightness and shortness of breath.    Cardiovascular: Negative for chest  pain, palpitations and leg swelling.   Gastrointestinal: Negative for abdominal pain, blood in stool, constipation, diarrhea, nausea and vomiting.   Genitourinary: Negative for dysuria and hematuria.   Musculoskeletal: Positive for myalgias. Negative for arthralgias.   Skin: Positive for rash. Negative for color change.   Allergic/Immunologic: Positive for environmental allergies.   Neurological: Positive for headaches. Negative for dizziness.   Hematological: Negative for adenopathy.   Psychiatric/Behavioral: Negative for suicidal ideas. The patient is not nervous/anxious.        Objective     There were no vitals taken for this visit.    Physical Exam  Constitutional:       General: She is not in acute distress.     Appearance: Normal appearance. She is not ill-appearing.   HENT:      Head: Normocephalic and atraumatic.   Eyes:      Conjunctiva/sclera:      Left eye: Left conjunctiva is injected.      Comments: Edema left eyelid   Pulmonary:      Effort: Pulmonary effort is normal. No respiratory distress.   Neurological:      General: No focal deficit present.      Mental Status: She is alert and oriented to person, place, and time.   Psychiatric:         Mood and Affect: Mood normal.         Behavior: Behavior normal.         Thought Content: Thought content normal.         Judgment: Judgment normal.         Current Outpatient Medications   Medication Sig Dispense Refill   • cetirizine (zyrTEC) 10 MG tablet Take 1 tablet by mouth Daily. 30 tablet 1   • erythromycin (ROMYCIN) 5 MG/GM ophthalmic ointment Apply  to eye(s) as directed by provider Every 6 (Six) Hours. 1 each 0   • escitalopram (LEXAPRO) 10 MG tablet Take 1 tablet by mouth once daily for 30 days 30 tablet 5   • loperamide (IMODIUM) 2 MG capsule Take 1 capsule by mouth 2 (Two) Times a Day As Needed for Diarrhea. 10 capsule 0   • Multiple Vitamins-Minerals (MULTIVITAMIN GUMMIES ADULT PO) Take  by mouth.     • norgestimate-ethinyl estradiol (Sprintec 28)  0.25-35 MG-MCG per tablet Take 1 tablet by mouth Daily. 28 tablet 12   • spironolactone (Aldactone) 100 MG tablet Take 0.5 tablets by mouth 3 (Three) Times a Day. 45 tablet 5     No current facility-administered medications for this visit.            Assessment & Plan     Problem List Items Addressed This Visit    None  Visit Diagnoses     Pink eye disease of left eye    -  Primary    Relevant Medications    erythromycin (ROMYCIN) 5 MG/GM ophthalmic ointment    Diarrhea, unspecified type                ICD-10-CM ICD-9-CM   1. Pink eye disease of left eye  H10.022 372.03   2. Diarrhea, unspecified type  R19.7 787.91       Plan: EES ointment ordered for conjunctivitis of left eye. She used a flashlight to elicit pupil constriction while on the video visit. It seems the diarrhea has resolved. Go to the eye doctor if you develop vision changes or your eye is not feeling better in a few days. Follow up next week.     @There is no height or weight on file to calculate BMI.     The use of a video visit has been reviewed with the patient and verbal informed consent has been obtained.          Understands disease processes and need for medications.  Understands reasons for urgent and emergent care.  Patient (& family) verbalized agreement for treatment plan.   Emotional support and active listening provided.  Patient provided time to verbalize feelings.           BMI is >= 25 and <30. (Overweight) The following options were offered after discussion;: weight loss educational material (shared in after visit summary)      This document has been electronically signed by JOHNNY Montano   October 13, 2022 10:07 EDT

## 2022-11-02 NOTE — TELEPHONE ENCOUNTER
PATIENT STATES SHE IS GOING TOMORROW TO BE PART OF A TRIAL FOR THE VACCINE FOR THE COVID 19   THEY WANTED HER TO LET HER PHYSICIAN KNOW THIS INFORMATION    PATIENT CALL BACK  [phone]       Bill For Surgical Tray: no Expected Date Of Service: 11/02/2022 Performing Laboratory: [id number] Lab Facility: [id number] Billing Type: Third-Party Bill

## 2023-01-09 ENCOUNTER — OFFICE VISIT (OUTPATIENT)
Dept: FAMILY MEDICINE CLINIC | Facility: CLINIC | Age: 43
End: 2023-01-09
Payer: COMMERCIAL

## 2023-01-09 VITALS
HEIGHT: 62 IN | WEIGHT: 162 LBS | HEART RATE: 99 BPM | DIASTOLIC BLOOD PRESSURE: 84 MMHG | SYSTOLIC BLOOD PRESSURE: 120 MMHG | BODY MASS INDEX: 29.81 KG/M2 | OXYGEN SATURATION: 99 % | RESPIRATION RATE: 18 BRPM | TEMPERATURE: 97.7 F

## 2023-01-09 DIAGNOSIS — G43.111 INTRACTABLE MIGRAINE WITH AURA WITH STATUS MIGRAINOSUS: Primary | ICD-10-CM

## 2023-01-09 PROCEDURE — 99213 OFFICE O/P EST LOW 20 MIN: CPT | Performed by: NURSE PRACTITIONER

## 2023-01-09 RX ORDER — HYDROXYCHLOROQUINE SULFATE 200 MG/1
200 TABLET, FILM COATED ORAL 2 TIMES DAILY
COMMUNITY
Start: 2022-11-11

## 2023-01-09 NOTE — PROGRESS NOTES
Subjective   Mohit Doherty is a 42 y.o. female.     Chief Complaint   Patient presents with   • Migraine       History of Present Illness  She presents with c/o migraines that have returned over the past few months. She states her migraines are raging. She has taken topamax in the past and recently was taking topamax for weight loss and her migraines returned while taking the topamax. She states she has a migraine right now. She had a prescription for imitrex in the past and it caused her to have a panic attack that was severe. She felt like she couldn't breathe and she had to get out. She had to call her grandmother to pick her up from school. She is having at least 3-4 migraines a month. She states they are almost debilitating. She c/o pain in her eyes. Usually it will be one sided and may go from one side to the other. Today it is across both eyes. She describes it as pressure and feeling like she has pressure and a dagger in her eyes. She c/o light and sound sensitivity. She states she had an aura the day before her last migraine. She has noticed her arms feeling tingly when she has a migraine. She states she noticed her left side tingling like it was asleep and she had a migraine on that side the next day. She had neck tightness and nausea. She states when she had migraines in the past, she had physical therapy for tension in her neck and her neck would relax while getting the therapy but the muscles would tighten back up when she left the physical therapy office. She has been taking tylenol. She was recently diagnosed with fibromyalgia and rheumatoid arthritis. She states she had a colonoscopy and was diagnosed with h. Pylori and diverticulum. She was treated for h. Pylori. Records requested. She was started on hydroxychloroquine for the rheumatoid and fibromyalgia.        The following portions of the patient's history were reviewed and updated as appropriate: allergies, current medications, past family  history, past medical history, past social history, past surgical history and problem list.    Review of Systems   Constitutional: Negative for fatigue, fever and unexpected weight change.   HENT: Negative for ear pain, rhinorrhea and sore throat.    Eyes: Positive for photophobia and pain. Negative for visual disturbance.   Respiratory: Negative for cough, chest tightness, shortness of breath and wheezing.    Cardiovascular: Negative for chest pain and palpitations.   Gastrointestinal: Positive for nausea. Negative for abdominal pain, blood in stool, constipation, diarrhea and vomiting.   Genitourinary: Negative for dysuria and hematuria.   Musculoskeletal: Positive for arthralgias, myalgias, neck pain and neck stiffness.   Allergic/Immunologic: Negative for environmental allergies.   Neurological: Positive for headaches.   Psychiatric/Behavioral: Negative for suicidal ideas. The patient is not nervous/anxious.        Objective     /84 (BP Location: Right arm, Patient Position: Sitting, Cuff Size: Adult)   Pulse 99   Temp 97.7 °F (36.5 °C) (Temporal)   Resp 18   Ht 157.5 cm (62.01\")   Wt 73.5 kg (162 lb)   SpO2 99%   BMI 29.62 kg/m²     Physical Exam  Vitals reviewed.   Constitutional:       General: She is not in acute distress.     Appearance: Normal appearance. She is well-developed. She is not diaphoretic.   HENT:      Head: Normocephalic and atraumatic.      Right Ear: Hearing, tympanic membrane, ear canal and external ear normal.      Left Ear: Hearing, tympanic membrane, ear canal and external ear normal.      Nose: Nose normal.      Right Sinus: No maxillary sinus tenderness or frontal sinus tenderness.      Left Sinus: No maxillary sinus tenderness or frontal sinus tenderness.      Mouth/Throat:      Mouth: Mucous membranes are moist.      Pharynx: Oropharynx is clear. Uvula midline.   Eyes:      General: Lids are normal. Vision grossly intact. No visual field deficit.     Extraocular  Movements: Extraocular movements intact.      Right eye: Normal extraocular motion and no nystagmus.      Left eye: Normal extraocular motion and no nystagmus.      Conjunctiva/sclera: Conjunctivae normal.      Pupils: Pupils are equal, round, and reactive to light. Pupils are equal.      Comments: Bilateral horizontal nystagmus   Neck:      Trachea: Trachea normal. No tracheal tenderness or tracheal deviation.   Cardiovascular:      Rate and Rhythm: Normal rate and regular rhythm.      Heart sounds: Normal heart sounds, S1 normal and S2 normal. No murmur heard.    No friction rub. No gallop.   Pulmonary:      Effort: Pulmonary effort is normal. No respiratory distress.      Breath sounds: Normal breath sounds.   Abdominal:      General: Bowel sounds are normal. There is no distension.      Palpations: Abdomen is soft.      Tenderness: There is no abdominal tenderness.   Musculoskeletal:      Cervical back: Normal. Normal range of motion.      Comments: Cervical pain with extension.   Lymphadenopathy:      Cervical: No cervical adenopathy.   Skin:     General: Skin is warm and dry.   Neurological:      General: No focal deficit present.      Mental Status: She is alert and oriented to person, place, and time.      Cranial Nerves: Cranial nerves 2-12 are intact.      Motor: Motor function is intact.      Coordination: Coordination is intact. Romberg sign negative.      Gait: Gait is intact.      Deep Tendon Reflexes: Babinski sign absent on the right side. Babinski sign absent on the left side.      Reflex Scores:       Tricep reflexes are 2+ on the right side and 2+ on the left side.       Bicep reflexes are 2+ on the right side and 2+ on the left side.       Brachioradialis reflexes are 2+ on the right side and 2+ on the left side.       Patellar reflexes are 2+ on the right side and 2+ on the left side.  Psychiatric:         Behavior: Behavior normal.         Thought Content: Thought content normal.          Judgment: Judgment normal.         Current Outpatient Medications   Medication Sig Dispense Refill   • cetirizine (zyrTEC) 10 MG tablet Take 1 tablet by mouth Daily. 30 tablet 1   • escitalopram (LEXAPRO) 10 MG tablet Take 1 tablet by mouth once daily for 30 days 30 tablet 5   • hydroxychloroquine (PLAQUENIL) 200 MG tablet Take 200 mg by mouth 2 (Two) Times a Day.     • loperamide (IMODIUM) 2 MG capsule Take 1 capsule by mouth 2 (Two) Times a Day As Needed for Diarrhea. 10 capsule 0   • Multiple Vitamins-Minerals (MULTIVITAMIN GUMMIES ADULT PO) Take  by mouth.     • norgestimate-ethinyl estradiol (Sprintec 28) 0.25-35 MG-MCG per tablet Take 1 tablet by mouth Daily. 28 tablet 12   • spironolactone (Aldactone) 100 MG tablet Take 0.5 tablets by mouth 3 (Three) Times a Day. 45 tablet 5     No current facility-administered medications for this visit.            Assessment & Plan     Problem List Items Addressed This Visit    None  Visit Diagnoses     Intractable migraine with aura with status migrainosus    -  Primary    Relevant Orders    Ambulatory Referral to Neurology (Completed)            ICD-10-CM ICD-9-CM   1. Intractable migraine with aura with status migrainosus  G43.111 346.03       Plan: Since the migraines returned while taking topamax and she had a bad reaction to imitrex in the past I do not want to try topamax or another triptan. Given nurtec samples. We discussed physical therapy for her neck pain. She states she already does the stretches they taught her in the past. Start a neurology referral for the migraines. She states she has nausea medicine at home already. Follow up in one month and as needed.     @Body mass index is 29.62 kg/m².              Understands disease processes and need for medications.  Understands reasons for urgent and emergent care.  Patient (& family) verbalized agreement for treatment plan.   Emotional support and active listening provided.  Patient provided time to verbalize  feelings.           BMI is >= 25 and <30. (Overweight) The following options were offered after discussion;: weight loss educational material (shared in after visit summary)      This document has been electronically signed by JOHNNY Montano   January 10, 2023 09:09 EST

## 2023-01-09 NOTE — Clinical Note
Will you get her records from [facility] rheumatology? Will you get her colonoscopy and egd and other records from Dr. Ozuna at [facility] in [facility]?

## 2023-01-14 DIAGNOSIS — F41.1 GENERALIZED ANXIETY DISORDER: ICD-10-CM

## 2023-01-16 RX ORDER — ESCITALOPRAM OXALATE 10 MG/1
TABLET ORAL
Qty: 30 TABLET | Refills: 0 | Status: SHIPPED | OUTPATIENT
Start: 2023-01-16 | End: 2023-01-17 | Stop reason: SDUPTHER

## 2023-01-17 DIAGNOSIS — F41.1 GENERALIZED ANXIETY DISORDER: ICD-10-CM

## 2023-01-17 DIAGNOSIS — L70.0 CYSTIC ACNE: Chronic | ICD-10-CM

## 2023-01-17 RX ORDER — SPIRONOLACTONE 100 MG/1
50 TABLET, FILM COATED ORAL 3 TIMES DAILY
Qty: 45 TABLET | Refills: 5 | Status: SHIPPED | OUTPATIENT
Start: 2023-01-17

## 2023-01-17 RX ORDER — ESCITALOPRAM OXALATE 10 MG/1
10 TABLET ORAL DAILY
Qty: 30 TABLET | Refills: 0 | Status: SHIPPED | OUTPATIENT
Start: 2023-01-17

## 2023-02-14 DIAGNOSIS — G43.111 INTRACTABLE MIGRAINE WITH AURA WITH STATUS MIGRAINOSUS: Primary | ICD-10-CM

## 2023-02-14 RX ORDER — RIMEGEPANT SULFATE 75 MG/75MG
75 TABLET, ORALLY DISINTEGRATING ORAL DAILY
Qty: 30 TABLET | Refills: 0 | Status: SHIPPED | OUTPATIENT
Start: 2023-02-14

## 2023-02-16 PROBLEM — G43.019 INTRACTABLE MIGRAINE WITHOUT AURA AND WITHOUT STATUS MIGRAINOSUS: Status: ACTIVE | Noted: 2023-02-16

## 2023-02-20 ENCOUNTER — PRIOR AUTHORIZATION (OUTPATIENT)
Dept: FAMILY MEDICINE CLINIC | Facility: CLINIC | Age: 43
End: 2023-02-20
Payer: COMMERCIAL

## 2023-02-20 DIAGNOSIS — G43.019 INTRACTABLE MIGRAINE WITHOUT AURA AND WITHOUT STATUS MIGRAINOSUS: Primary | ICD-10-CM

## 2023-02-20 NOTE — TELEPHONE ENCOUNTER
Nerte denied via [employer] [facility]:    Your plan covers this drug when your doctor provides documentation that you meet any of these conditions:  - You have a clinical condition for which there is no appropriate formulary alternative  - You need a form of the drug that is not on the formulary  - You tried the required number of preferred formulary alternatives on your formularyfirst. These drugs did not work for you or you cannot take them.  Your request has been denied based on the information we have.    Formulary alternative(s) are   Ubrelvy ( acute treatment),   Aimovig, Emgality (prophylaxes) . Requirement: 3 in a class with 3 or more alternatives,   2 in a class with 2  alternatives,   or 1 in a class with only 1 alternative.

## 2023-03-03 ENCOUNTER — PRIOR AUTHORIZATION (OUTPATIENT)
Dept: FAMILY MEDICINE CLINIC | Facility: CLINIC | Age: 43
End: 2023-03-03
Payer: COMMERCIAL

## 2023-03-17 ENCOUNTER — TRANSCRIBE ORDERS (OUTPATIENT)
Dept: ADMINISTRATIVE | Facility: HOSPITAL | Age: 43
End: 2023-03-17
Payer: COMMERCIAL

## 2023-03-17 DIAGNOSIS — Z12.31 ENCOUNTER FOR SCREENING MAMMOGRAM FOR MALIGNANT NEOPLASM OF BREAST: Primary | ICD-10-CM

## 2023-04-04 ENCOUNTER — HOSPITAL ENCOUNTER (OUTPATIENT)
Dept: MAMMOGRAPHY | Facility: HOSPITAL | Age: 43
Discharge: HOME | End: 2023-04-04
Admitting: PHYSICIAN ASSISTANT
Payer: COMMERCIAL

## 2023-04-04 DIAGNOSIS — Z12.31 ENCOUNTER FOR SCREENING MAMMOGRAM FOR MALIGNANT NEOPLASM OF BREAST: ICD-10-CM

## 2023-04-04 PROCEDURE — 77067 SCR MAMMO BI INCL CAD: CPT | Performed by: RADIOLOGY

## 2023-04-04 PROCEDURE — 77063 BREAST TOMOSYNTHESIS BI: CPT

## 2023-04-04 PROCEDURE — 77067 SCR MAMMO BI INCL CAD: CPT

## 2023-04-04 PROCEDURE — 77063 BREAST TOMOSYNTHESIS BI: CPT | Performed by: RADIOLOGY

## 2023-06-02 ENCOUNTER — HOSPITAL ENCOUNTER (OUTPATIENT)
Dept: GENERAL RADIOLOGY | Facility: HOSPITAL | Age: 43
Discharge: HOME | End: 2023-06-02
Payer: COMMERCIAL

## 2023-06-02 ENCOUNTER — OFFICE VISIT (OUTPATIENT)
Dept: UROLOGY | Facility: CLINIC | Age: 43
End: 2023-06-02

## 2023-06-02 VITALS
HEART RATE: 107 BPM | HEIGHT: 62 IN | WEIGHT: 156.2 LBS | BODY MASS INDEX: 28.74 KG/M2 | SYSTOLIC BLOOD PRESSURE: 118 MMHG | DIASTOLIC BLOOD PRESSURE: 74 MMHG

## 2023-06-02 DIAGNOSIS — N20.0 RIGHT RENAL STONE: Primary | ICD-10-CM

## 2023-06-02 DIAGNOSIS — K59.01 SLOW TRANSIT CONSTIPATION: ICD-10-CM

## 2023-06-02 DIAGNOSIS — R35.0 FREQUENCY OF MICTURITION: ICD-10-CM

## 2023-06-02 PROCEDURE — 87086 URINE CULTURE/COLONY COUNT: CPT

## 2023-06-02 PROCEDURE — 74018 RADEX ABDOMEN 1 VIEW: CPT | Performed by: RADIOLOGY

## 2023-06-02 PROCEDURE — 74018 RADEX ABDOMEN 1 VIEW: CPT

## 2023-06-02 RX ORDER — DULOXETIN HYDROCHLORIDE 30 MG/1
2 CAPSULE, DELAYED RELEASE ORAL DAILY
COMMUNITY
Start: 2023-03-21

## 2023-06-02 RX ORDER — KETOROLAC TROMETHAMINE 10 MG/1
TABLET, FILM COATED ORAL
COMMUNITY
Start: 2023-05-30

## 2023-06-02 RX ORDER — CIPROFLOXACIN 500 MG/1
TABLET, FILM COATED ORAL
COMMUNITY
Start: 2023-05-30

## 2023-06-02 RX ORDER — TAMSULOSIN HYDROCHLORIDE 0.4 MG/1
CAPSULE ORAL
COMMUNITY
Start: 2023-05-30
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
## 2023-06-03 ENCOUNTER — HOSPITAL ENCOUNTER (EMERGENCY)
Facility: HOSPITAL | Age: 43
Discharge: HOME | End: 2023-06-04
Attending: EMERGENCY MEDICINE | Admitting: EMERGENCY MEDICINE
Payer: COMMERCIAL

## 2023-06-03 DIAGNOSIS — K59.00 CONSTIPATION, UNSPECIFIED CONSTIPATION TYPE: Primary | ICD-10-CM

## 2023-06-03 LAB
BACTERIA SPEC AEROBE CULT: NO GROWTH
BACTERIA UR QL AUTO: ABNORMAL /HPF
BASOPHILS # BLD AUTO: 0.07 10*3/MM3 (ref 0–0.2)
BASOPHILS NFR BLD AUTO: 0.6 % (ref 0–1.5)
BILIRUB UR QL STRIP: NEGATIVE
CLARITY UR: CLEAR
COLOR UR: YELLOW
DEPRECATED RDW RBC AUTO: 58.5 FL (ref 37–54)
EOSINOPHIL # BLD AUTO: 0.05 10*3/MM3 (ref 0–0.4)
EOSINOPHIL NFR BLD AUTO: 0.4 % (ref 0.3–6.2)
ERYTHROCYTE [DISTWIDTH] IN BLOOD BY AUTOMATED COUNT: 17 % (ref 12.3–15.4)
GLUCOSE UR STRIP-MCNC: NEGATIVE MG/DL
HCT VFR BLD AUTO: 38.9 % (ref 34–46.6)
HGB BLD-MCNC: 13.1 G/DL (ref 12–15.9)
HGB UR QL STRIP.AUTO: ABNORMAL
HYALINE CASTS UR QL AUTO: ABNORMAL /LPF
IMM GRANULOCYTES # BLD AUTO: 0.05 10*3/MM3 (ref 0–0.05)
IMM GRANULOCYTES NFR BLD AUTO: 0.4 % (ref 0–0.5)
KETONES UR QL STRIP: NEGATIVE
LEUKOCYTE ESTERASE UR QL STRIP.AUTO: NEGATIVE
LYMPHOCYTES # BLD AUTO: 2.23 10*3/MM3 (ref 0.7–3.1)
LYMPHOCYTES NFR BLD AUTO: 17.6 % (ref 19.6–45.3)
MCH RBC QN AUTO: 32 PG (ref 26.6–33)
MCHC RBC AUTO-ENTMCNC: 33.7 G/DL (ref 31.5–35.7)
MCV RBC AUTO: 94.9 FL (ref 79–97)
MONOCYTES # BLD AUTO: 0.86 10*3/MM3 (ref 0.1–0.9)
MONOCYTES NFR BLD AUTO: 6.8 % (ref 5–12)
NEUTROPHILS NFR BLD AUTO: 74.2 % (ref 42.7–76)
NEUTROPHILS NFR BLD AUTO: 9.39 10*3/MM3 (ref 1.7–7)
NITRITE UR QL STRIP: NEGATIVE
NRBC BLD AUTO-RTO: 0 /100 WBC (ref 0–0.2)
PH UR STRIP.AUTO: 7.5 [PH] (ref 5–8)
PLATELET # BLD AUTO: 388 10*3/MM3 (ref 140–450)
PMV BLD AUTO: 8.4 FL (ref 6–12)
PROT UR QL STRIP: NEGATIVE
RBC # BLD AUTO: 4.1 10*6/MM3 (ref 3.77–5.28)
RBC # UR STRIP: ABNORMAL /HPF
REF LAB TEST METHOD: ABNORMAL
SP GR UR STRIP: 1.01 (ref 1–1.03)
SQUAMOUS #/AREA URNS HPF: ABNORMAL /HPF
UROBILINOGEN UR QL STRIP: ABNORMAL
WBC # UR STRIP: ABNORMAL /HPF
WBC NRBC COR # BLD: 12.65 10*3/MM3 (ref 3.4–10.8)

## 2023-06-03 PROCEDURE — 83690 ASSAY OF LIPASE: CPT | Performed by: NURSE PRACTITIONER

## 2023-06-03 PROCEDURE — 99284 EMERGENCY DEPT VISIT MOD MDM: CPT

## 2023-06-03 PROCEDURE — 80053 COMPREHEN METABOLIC PANEL: CPT | Performed by: NURSE PRACTITIONER

## 2023-06-03 PROCEDURE — 36415 COLL VENOUS BLD VENIPUNCTURE: CPT

## 2023-06-03 PROCEDURE — 85025 COMPLETE CBC W/AUTO DIFF WBC: CPT | Performed by: NURSE PRACTITIONER

## 2023-06-03 PROCEDURE — 81001 URINALYSIS AUTO W/SCOPE: CPT | Performed by: NURSE PRACTITIONER

## 2023-06-04 ENCOUNTER — APPOINTMENT (OUTPATIENT)
Dept: GENERAL RADIOLOGY | Facility: HOSPITAL | Age: 43
End: 2023-06-04
Payer: COMMERCIAL

## 2023-06-04 VITALS
HEIGHT: 63 IN | HEART RATE: 101 BPM | RESPIRATION RATE: 16 BRPM | SYSTOLIC BLOOD PRESSURE: 147 MMHG | BODY MASS INDEX: 27.11 KG/M2 | TEMPERATURE: 97.7 F | WEIGHT: 153 LBS | DIASTOLIC BLOOD PRESSURE: 89 MMHG | OXYGEN SATURATION: 100 %

## 2023-06-04 LAB
ALBUMIN SERPL-MCNC: 4.2 G/DL (ref 3.5–5.2)
ALBUMIN/GLOB SERPL: 1.4 G/DL
ALP SERPL-CCNC: 131 U/L (ref 39–117)
ALT SERPL W P-5'-P-CCNC: 10 U/L (ref 1–33)
ANION GAP SERPL CALCULATED.3IONS-SCNC: 13 MMOL/L (ref 5–15)
AST SERPL-CCNC: 17 U/L (ref 1–32)
BILIRUB SERPL-MCNC: 0.7 MG/DL (ref 0–1.2)
BUN SERPL-MCNC: 6 MG/DL (ref 6–20)
BUN/CREAT SERPL: 8.8 (ref 7–25)
CALCIUM SPEC-SCNC: 9.4 MG/DL (ref 8.6–10.5)
CHLORIDE SERPL-SCNC: 99 MMOL/L (ref 98–107)
CO2 SERPL-SCNC: 24 MMOL/L (ref 22–29)
CREAT SERPL-MCNC: 0.68 MG/DL (ref 0.57–1)
EGFRCR SERPLBLD CKD-EPI 2021: 112.4 ML/MIN/1.73
GLOBULIN UR ELPH-MCNC: 3.1 GM/DL
GLUCOSE SERPL-MCNC: 101 MG/DL (ref 65–99)
LIPASE SERPL-CCNC: 17 U/L (ref 13–60)
POTASSIUM SERPL-SCNC: 3.7 MMOL/L (ref 3.5–5.2)
PROT SERPL-MCNC: 7.3 G/DL (ref 6–8.5)
SODIUM SERPL-SCNC: 136 MMOL/L (ref 136–145)

## 2023-06-04 PROCEDURE — 74018 RADEX ABDOMEN 1 VIEW: CPT

## 2023-06-04 PROCEDURE — 74018 RADEX ABDOMEN 1 VIEW: CPT | Performed by: RADIOLOGY
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
## 2023-10-19 ENCOUNTER — APPOINTMENT (OUTPATIENT)
Dept: CT IMAGING | Facility: HOSPITAL | Age: 43
End: 2023-10-19
Payer: COMMERCIAL

## 2023-10-19 ENCOUNTER — HOSPITAL ENCOUNTER (EMERGENCY)
Facility: HOSPITAL | Age: 43
Discharge: HOME | End: 2023-10-19
Attending: EMERGENCY MEDICINE
Payer: COMMERCIAL

## 2023-10-19 VITALS
WEIGHT: 155 LBS | OXYGEN SATURATION: 100 % | RESPIRATION RATE: 17 BRPM | HEART RATE: 87 BPM | BODY MASS INDEX: 27.46 KG/M2 | DIASTOLIC BLOOD PRESSURE: 73 MMHG | SYSTOLIC BLOOD PRESSURE: 145 MMHG | TEMPERATURE: 98.2 F | HEIGHT: 63 IN

## 2023-10-19 DIAGNOSIS — K60.0 ACUTE ANAL FISSURE: Primary | ICD-10-CM

## 2023-10-19 LAB
ALBUMIN SERPL-MCNC: 4.2 G/DL (ref 3.5–5.2)
ALBUMIN/GLOB SERPL: 1.4 G/DL
ALP SERPL-CCNC: 103 U/L (ref 39–117)
ALT SERPL W P-5'-P-CCNC: 9 U/L (ref 1–33)
ANION GAP SERPL CALCULATED.3IONS-SCNC: 9.8 MMOL/L (ref 5–15)
APTT PPP: 30.1 SECONDS (ref 26.5–34.5)
AST SERPL-CCNC: 21 U/L (ref 1–32)
BASOPHILS # BLD AUTO: 0.05 10*3/MM3 (ref 0–0.2)
BASOPHILS NFR BLD AUTO: 0.8 % (ref 0–1.5)
BILIRUB SERPL-MCNC: 0.5 MG/DL (ref 0–1.2)
BUN SERPL-MCNC: 6 MG/DL (ref 6–20)
BUN/CREAT SERPL: 10.2 (ref 7–25)
CALCIUM SPEC-SCNC: 9.4 MG/DL (ref 8.6–10.5)
CHLORIDE SERPL-SCNC: 104 MMOL/L (ref 98–107)
CO2 SERPL-SCNC: 26.2 MMOL/L (ref 22–29)
CREAT SERPL-MCNC: 0.59 MG/DL (ref 0.57–1)
DEPRECATED RDW RBC AUTO: 49.3 FL (ref 37–54)
EGFRCR SERPLBLD CKD-EPI 2021: 115.6 ML/MIN/1.73
EOSINOPHIL # BLD AUTO: 0.1 10*3/MM3 (ref 0–0.4)
EOSINOPHIL NFR BLD AUTO: 1.5 % (ref 0.3–6.2)
ERYTHROCYTE [DISTWIDTH] IN BLOOD BY AUTOMATED COUNT: 14.8 % (ref 12.3–15.4)
GLOBULIN UR ELPH-MCNC: 3 GM/DL
GLUCOSE SERPL-MCNC: 89 MG/DL (ref 65–99)
HCG SERPL QL: NEGATIVE
HCT VFR BLD AUTO: 40.2 % (ref 34–46.6)
HGB BLD-MCNC: 13.6 G/DL (ref 12–15.9)
HOLD SPECIMEN: NORMAL
HOLD SPECIMEN: NORMAL
IMM GRANULOCYTES # BLD AUTO: 0.02 10*3/MM3 (ref 0–0.05)
IMM GRANULOCYTES NFR BLD AUTO: 0.3 % (ref 0–0.5)
INR PPP: 1.06 (ref 0.9–1.1)
LYMPHOCYTES # BLD AUTO: 1.43 10*3/MM3 (ref 0.7–3.1)
LYMPHOCYTES NFR BLD AUTO: 21.9 % (ref 19.6–45.3)
MCH RBC QN AUTO: 30.8 PG (ref 26.6–33)
MCHC RBC AUTO-ENTMCNC: 33.8 G/DL (ref 31.5–35.7)
MCV RBC AUTO: 91.2 FL (ref 79–97)
MONOCYTES # BLD AUTO: 0.59 10*3/MM3 (ref 0.1–0.9)
MONOCYTES NFR BLD AUTO: 9 % (ref 5–12)
NEUTROPHILS NFR BLD AUTO: 4.34 10*3/MM3 (ref 1.7–7)
NEUTROPHILS NFR BLD AUTO: 66.5 % (ref 42.7–76)
NRBC BLD AUTO-RTO: 0 /100 WBC (ref 0–0.2)
PLATELET # BLD AUTO: 353 10*3/MM3 (ref 140–450)
PMV BLD AUTO: 8.9 FL (ref 6–12)
POTASSIUM SERPL-SCNC: 3.8 MMOL/L (ref 3.5–5.2)
PROT SERPL-MCNC: 7.2 G/DL (ref 6–8.5)
PROTHROMBIN TIME: 14.4 SECONDS (ref 12.1–14.7)
RBC # BLD AUTO: 4.41 10*6/MM3 (ref 3.77–5.28)
SODIUM SERPL-SCNC: 140 MMOL/L (ref 136–145)
WBC NRBC COR # BLD: 6.53 10*3/MM3 (ref 3.4–10.8)
WHOLE BLOOD HOLD COAG: NORMAL
WHOLE BLOOD HOLD SPECIMEN: NORMAL

## 2023-10-19 PROCEDURE — 36415 COLL VENOUS BLD VENIPUNCTURE: CPT

## 2023-10-19 PROCEDURE — 85025 COMPLETE CBC W/AUTO DIFF WBC: CPT | Performed by: EMERGENCY MEDICINE

## 2023-10-19 PROCEDURE — 80053 COMPREHEN METABOLIC PANEL: CPT | Performed by: EMERGENCY MEDICINE

## 2023-10-19 PROCEDURE — 84703 CHORIONIC GONADOTROPIN ASSAY: CPT | Performed by: EMERGENCY MEDICINE

## 2023-10-19 PROCEDURE — 74176 CT ABD & PELVIS W/O CONTRAST: CPT

## 2023-10-19 PROCEDURE — 85610 PROTHROMBIN TIME: CPT | Performed by: EMERGENCY MEDICINE

## 2023-10-19 PROCEDURE — 99284 EMERGENCY DEPT VISIT MOD MDM: CPT

## 2023-10-19 PROCEDURE — 85730 THROMBOPLASTIN TIME PARTIAL: CPT | Performed by: EMERGENCY MEDICINE

## 2023-10-19 PROCEDURE — 74176 CT ABD & PELVIS W/O CONTRAST: CPT | Performed by: RADIOLOGY

## 2023-10-19 RX ORDER — SODIUM CHLORIDE 0.9 % (FLUSH) 0.9 %
10 SYRINGE (ML) INJECTION AS NEEDED
Status: DISCONTINUED | OUTPATIENT
Start: 2023-10-19 | End: 2023-10-19 | Stop reason: HOSPADM

## 2023-10-19 RX ORDER — HYDROCORTISONE ACETATE 25 MG/1
25 SUPPOSITORY RECTAL 2 TIMES DAILY
Qty: 21 SUPPOSITORY | Refills: 0 | Status: SHIPPED | OUTPATIENT
Start: 2023-10-19
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
## 2024-01-03 ENCOUNTER — OFFICE VISIT (OUTPATIENT)
Dept: ENDOCRINOLOGY | Facility: CLINIC | Age: 44
End: 2024-01-03
Payer: COMMERCIAL

## 2024-01-03 VITALS
SYSTOLIC BLOOD PRESSURE: 126 MMHG | DIASTOLIC BLOOD PRESSURE: 84 MMHG | BODY MASS INDEX: 28 KG/M2 | OXYGEN SATURATION: 99 % | WEIGHT: 158 LBS | HEART RATE: 99 BPM | HEIGHT: 63 IN

## 2024-01-03 DIAGNOSIS — E16.2 HYPOGLYCEMIA: Primary | ICD-10-CM

## 2024-01-03 LAB
EXPIRATION DATE: ABNORMAL
GLUCOSE BLDC GLUCOMTR-MCNC: 97 MG/DL (ref 70–130)
HBA1C MFR BLD: 4.2 % (ref 4.5–5.7)
Lab: ABNORMAL

## 2024-01-03 PROCEDURE — 99214 OFFICE O/P EST MOD 30 MIN: CPT | Performed by: NURSE PRACTITIONER

## 2024-01-03 PROCEDURE — 82947 ASSAY GLUCOSE BLOOD QUANT: CPT | Performed by: NURSE PRACTITIONER

## 2024-01-03 PROCEDURE — 83036 HEMOGLOBIN GLYCOSYLATED A1C: CPT | Performed by: NURSE PRACTITIONER

## 2024-01-03 RX ORDER — PROPRANOLOL HYDROCHLORIDE 20 MG/1
20 TABLET ORAL 3 TIMES DAILY
COMMUNITY

## 2024-01-03 RX ORDER — NORGESTIMATE AND ETHINYL ESTRADIOL 0.25-0.035
1 KIT ORAL DAILY
COMMUNITY

## 2024-01-03 RX ORDER — CETIRIZINE HYDROCHLORIDE 5 MG/1
5 TABLET ORAL DAILY
COMMUNITY
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
## 2024-01-09 PROBLEM — E16.2 HYPOGLYCEMIA: Status: ACTIVE | Noted: 2024-01-09

## 2024-01-09 NOTE — ASSESSMENT & PLAN NOTE
Patient's A1c low at 4.2.  Discussed the various underlying causes of hypoglycemia with patient.    CGM was placed in the office today. I have asked the pt to try to reproduce the episodes at home and if BG < 55 on glucometer, additional lab evaluation during an episode when BG is < 55 is necessary. If no episodes with glucose < 55, I expect the patient may be experiencing normal (though pronounced) adrenergic symptoms related to fasting and dietary modification may be all that is needed.  GTT is not the preferred testing for workup as 10% or more of the normal population may have a hypoglycemic response to this test.  Discussed dietary modifications to maintain normoglycemia.    Follow-up in office in 2 weeks.

## 2024-01-18 ENCOUNTER — OFFICE VISIT (OUTPATIENT)
Dept: ENDOCRINOLOGY | Facility: CLINIC | Age: 44
End: 2024-01-18
Payer: COMMERCIAL

## 2024-01-18 VITALS
WEIGHT: 158.2 LBS | OXYGEN SATURATION: 98 % | BODY MASS INDEX: 28.03 KG/M2 | SYSTOLIC BLOOD PRESSURE: 130 MMHG | DIASTOLIC BLOOD PRESSURE: 70 MMHG | HEART RATE: 88 BPM | HEIGHT: 63 IN

## 2024-01-18 DIAGNOSIS — E16.2 HYPOGLYCEMIA: Primary | ICD-10-CM

## 2024-01-18 PROCEDURE — 84681 ASSAY OF C-PEPTIDE: CPT | Performed by: NURSE PRACTITIONER

## 2024-01-18 PROCEDURE — 83525 ASSAY OF INSULIN: CPT | Performed by: NURSE PRACTITIONER

## 2024-01-18 PROCEDURE — G0480 DRUG TEST DEF 1-7 CLASSES: HCPCS | Performed by: NURSE PRACTITIONER
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
## 2024-01-19 LAB
C PEPTIDE SERPL-MCNC: 4.2 NG/ML (ref 1.1–4.4)
INSULIN SERPL-ACNC: 18.4 UIU/ML (ref 2.6–24.9)

## 2024-01-19 NOTE — ASSESSMENT & PLAN NOTE
Patient's hypoglycemia appears to be directly related to her diet.  She wore a CGM for 2 weeks and was noted to have the following data:  Very High: 0%  High: 0%  IN range: 100%  Low: 0%  Very Low:0%  She does have a few values the first few days that she was wearing the CGM in the 60's and one overt episode overnight at 54. Since this time, she has changed her diet.  Discussed that it is important to balance her carbs and protein and not completely eliminate these from her diet to prevent hypos.  She will work on improving balancing her diet for the next 3 months.  Will also obtain labs to ensure that no other underlying condition is present.  Follow-up in 3 months.

## 2024-01-29 LAB
ACETOHEXAMIDE UR-MCNC: NEGATIVE UG/ML
CHLORPROPAMIDE UR-MCNC: NEGATIVE UG/ML
GLIMEPIRIDE UR-MCNC: NEGATIVE NG/ML
GLIPIZIDE UR-MCNC: NEGATIVE NG/ML
GLYBURIDE UR-MCNC: NEGATIVE NG/ML
NATEGLINIDE UR-MCNC: NEGATIVE NG/ML
REPAGLINIDE UR-MCNC: NEGATIVE NG/ML
TOLAZAMIDE UR-MCNC: NEGATIVE UG/ML
TOLBUTAMIDE UR-MCNC: NEGATIVE UG/ML

## 2024-01-29 RX ORDER — BLOOD-GLUCOSE SENSOR
1 EACH MISCELLANEOUS
Qty: 2 EACH | Refills: 2 | Status: SHIPPED | OUTPATIENT
Start: 2024-01-29

## 2024-10-03 RX ORDER — ACYCLOVIR 400 MG/1
1 TABLET ORAL TAKE AS DIRECTED
Qty: 1 EACH | Refills: 0 | Status: SHIPPED | OUTPATIENT
Start: 2024-10-03

## 2024-10-03 RX ORDER — ACYCLOVIR 400 MG/1
1 TABLET ORAL
Qty: 3 EACH | Refills: 5 | Status: SHIPPED | OUTPATIENT
Start: 2024-10-03

## 2024-10-29 ENCOUNTER — TELEPHONE (OUTPATIENT)
Dept: ENDOCRINOLOGY | Facility: CLINIC | Age: 44
End: 2024-10-29

## 2024-10-29 NOTE — TELEPHONE ENCOUNTER
Please let patient know that with her values being less than 55, that there are a few things that could be going on.  If she would like to come in and do a fasting glucose challenge, where she fasts for at least 8 hours, then comes to the office and once her levels go under 55, we can draw additional labs.    If she is having values under 70, that are not coming up with eating food, she needs to make sure to confirm with a finger stick blood sugar.    If she is eating increased carbs and little to no protein with a meal she could likely be having post meal hyperglycemia with rebound hypoglycemia.  She needs to restrict her carbs and incorporate protein into each meal.

## 2024-10-29 NOTE — TELEPHONE ENCOUNTER
Caller: Mohit Doherty    Relationship to patient: Self    Best call back number: [phone]      Patient is needing: PT CALLED STATING THAT SHE CAN NOT GET HER BS UP. PT STATED THAT HER LEVELS ARE AT  LOW 52. PT STATED THAT HER ALARM WENT OFF FOUR TIMES LAST NIGHT AND SHE ATE SUGAR AND IT DIDN'T GET OVER 100. PLEASE ADVISE AND CALL BACK. PT STATED THAT SHE WILL REACH OUT TO PCP AS WELL.

## 2024-11-20 ENCOUNTER — OFFICE VISIT (OUTPATIENT)
Dept: ENDOCRINOLOGY | Facility: CLINIC | Age: 44
End: 2024-11-20
Payer: COMMERCIAL

## 2024-11-20 VITALS
OXYGEN SATURATION: 97 % | BODY MASS INDEX: 31.46 KG/M2 | SYSTOLIC BLOOD PRESSURE: 140 MMHG | WEIGHT: 177.6 LBS | HEART RATE: 103 BPM | DIASTOLIC BLOOD PRESSURE: 90 MMHG

## 2024-11-20 DIAGNOSIS — E16.2 HYPOGLYCEMIA: Primary | ICD-10-CM

## 2024-11-20 LAB
EXPIRATION DATE: NORMAL
EXPIRATION DATE: NORMAL
GLUCOSE BLDC GLUCOMTR-MCNC: 99 MG/DL (ref 70–130)
HBA1C MFR BLD: 4.1 % (ref 4.5–5.7)
Lab: NORMAL
Lab: NORMAL

## 2024-11-20 PROCEDURE — 99213 OFFICE O/P EST LOW 20 MIN: CPT | Performed by: NURSE PRACTITIONER

## 2024-11-20 PROCEDURE — 83036 HEMOGLOBIN GLYCOSYLATED A1C: CPT | Performed by: NURSE PRACTITIONER

## 2024-11-20 PROCEDURE — 82947 ASSAY GLUCOSE BLOOD QUANT: CPT | Performed by: NURSE PRACTITIONER
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
## 2024-11-22 NOTE — ASSESSMENT & PLAN NOTE
Discussed performing hypoglycemia challenge with patient.  She is going to come the office for this next week and I have asked the pt to try to reproduce the episodes at home and if BG < 55 on glucometer, additional lab evaluation during an episode when BG is < 55 is necessary. If no episodes with glucose < 55, I expect the patient may be experiencing normal (though pronounced) adrenergic symptoms related to fasting and dietary modification may be all that is needed.  GTT is not the preferred testing for workup as 10% or more of the normal population may have a hypoglycemic response to this test.

## 2024-11-27 ENCOUNTER — OFFICE VISIT (OUTPATIENT)
Dept: ENDOCRINOLOGY | Facility: CLINIC | Age: 44
End: 2024-11-27
Payer: COMMERCIAL

## 2024-11-27 VITALS
OXYGEN SATURATION: 96 % | DIASTOLIC BLOOD PRESSURE: 75 MMHG | BODY MASS INDEX: 30.96 KG/M2 | SYSTOLIC BLOOD PRESSURE: 127 MMHG | HEART RATE: 90 BPM | WEIGHT: 174.8 LBS

## 2024-11-27 DIAGNOSIS — E16.2 HYPOGLYCEMIA: Primary | ICD-10-CM

## 2024-11-27 NOTE — PROGRESS NOTES
Patient came in for a hypoglycemic challenge today in office.  Patient was complaining of hypoglycemic events happening often.  Patient came in fasting to the office and did not have any glucoses noted less than 80.  Patient was advised to eat a high carb high sugar meal and patient did not have any postprandial hypoglycemia either patient's values were within normal range for nondiabetic patient.  Discussed reducing carb intake and increasing protein intake specifically at dinnertime and before bed to prevent rebound postprandial hypoglycemia.  Patient and family verbalized understanding.  Patient was informed that if her symptoms are worsening and she starts having hypoglycemia less than 55 more often with confirmation fingerstick to notify the office to come back in for another hypoglycemia challenge.

## 2025-04-01 ENCOUNTER — HOSPITAL ENCOUNTER (OUTPATIENT)
Dept: MAMMOGRAPHY | Facility: HOSPITAL | Age: 45
Discharge: HOME | End: 2025-04-01
Admitting: PHYSICIAN ASSISTANT
Payer: COMMERCIAL

## 2025-04-01 DIAGNOSIS — Z12.31 VISIT FOR SCREENING MAMMOGRAM: ICD-10-CM

## 2025-04-01 PROCEDURE — 77067 SCR MAMMO BI INCL CAD: CPT

## 2025-04-01 PROCEDURE — 77063 BREAST TOMOSYNTHESIS BI: CPT
